# Patient Record
Sex: MALE | Race: WHITE | Employment: FULL TIME | URBAN - METROPOLITAN AREA
[De-identification: names, ages, dates, MRNs, and addresses within clinical notes are randomized per-mention and may not be internally consistent; named-entity substitution may affect disease eponyms.]

---

## 2017-05-23 ENCOUNTER — TELEPHONE (OUTPATIENT)
Dept: INTERNAL MEDICINE CLINIC | Facility: CLINIC | Age: 62
End: 2017-05-23

## 2017-05-23 NOTE — TELEPHONE ENCOUNTER
Patient reports experiencing anxiety and depression for the past couple months and is concerned that there could be a medical component that is contributing to these feelings.  He attempted to schedule an appt with Dr Ata Ontiveros, but no appts available until J

## 2017-05-23 NOTE — TELEPHONE ENCOUNTER
Actions Requested: scheduled appt w/ PCP 5/25/17  Situation/Background   Problem: repeated anxiety attacks, long periods of melancholy   Onset: > 3months ago becoming more frequent   Associated Symptoms: prolonged periods of melancholy and anxiety,  No new breathing and persists > 10 minutes and not relieved by reassurance provided by triager  * Lightheadedness or dizziness and persists > 10 minutes and not relieved by reassurance provided by triager  * Known or suspected alcohol or drug abuse and feeling ve

## 2017-05-25 ENCOUNTER — LAB ENCOUNTER (OUTPATIENT)
Dept: LAB | Facility: HOSPITAL | Age: 62
End: 2017-05-25
Attending: INTERNAL MEDICINE
Payer: COMMERCIAL

## 2017-05-25 DIAGNOSIS — F32.A DEPRESSIVE DISORDER: ICD-10-CM

## 2017-05-25 DIAGNOSIS — F41.9 ANXIETY: ICD-10-CM

## 2017-05-25 PROCEDURE — 85025 COMPLETE CBC W/AUTO DIFF WBC: CPT

## 2017-05-25 PROCEDURE — 80053 COMPREHEN METABOLIC PANEL: CPT

## 2017-05-25 PROCEDURE — 82607 VITAMIN B-12: CPT

## 2017-05-25 PROCEDURE — 84443 ASSAY THYROID STIM HORMONE: CPT

## 2017-05-25 PROCEDURE — 82306 VITAMIN D 25 HYDROXY: CPT

## 2017-05-25 PROCEDURE — 36415 COLL VENOUS BLD VENIPUNCTURE: CPT

## 2017-05-25 NOTE — PROGRESS NOTES
HPI:    Patient ID: Michelle Marin is a 65year old male. HPI  Patient is here with concerns about depression and anxiety. Is a relatively new issue for the patient. We saw him here last year and he was doing okay.   Just talked about the usual chron Age of Onset   • Cancer Father      Per NG: lung -smoker   • Cholelithiasis[other] [OTHER] Mother    • Cholelithiasis[other] [OTHER] Sister    • Cancer Paternal Grandfather      Per NG: lung -smoker        Smoking Status: Never Smoker content normal. His mood appears depressed.               ASSESSMENT/PLAN:   (F41.9) Anxiety  (primary encounter diagnosis)  Plan: VITAMIN B12, ASSAY, THYROID STIM HORMONE,         VITAMIN D, 25-HYDROXY, CBC WITH DIFFERENTIAL         WITH PLATELET, COMP MET

## 2017-09-23 ENCOUNTER — OFFICE VISIT (OUTPATIENT)
Dept: INTERNAL MEDICINE CLINIC | Facility: CLINIC | Age: 62
End: 2017-09-23

## 2017-09-23 VITALS
HEART RATE: 70 BPM | WEIGHT: 191 LBS | RESPIRATION RATE: 18 BRPM | BODY MASS INDEX: 27.97 KG/M2 | SYSTOLIC BLOOD PRESSURE: 102 MMHG | DIASTOLIC BLOOD PRESSURE: 68 MMHG | TEMPERATURE: 99 F | HEIGHT: 69.3 IN

## 2017-09-23 DIAGNOSIS — Z00.00 ROUTINE PHYSICAL EXAMINATION: Primary | ICD-10-CM

## 2017-09-23 DIAGNOSIS — Z23 NEED FOR VACCINATION: ICD-10-CM

## 2017-09-23 PROCEDURE — 90736 HZV VACCINE LIVE SUBQ: CPT | Performed by: INTERNAL MEDICINE

## 2017-09-23 PROCEDURE — 99396 PREV VISIT EST AGE 40-64: CPT | Performed by: INTERNAL MEDICINE

## 2017-09-23 PROCEDURE — 90471 IMMUNIZATION ADMIN: CPT | Performed by: INTERNAL MEDICINE

## 2017-09-23 NOTE — PROGRESS NOTES
HPI:    Patient ID: Mindi Armstrong is a 65year old male. HPI  Patient is here requesting a physical exam.  Last seen here in May. At that time he was having life stressors with depression and anxiety. He did not take any medications.   He did go to Smoker                                                              Smokeless tobacco: Never Used                      Alcohol use: No                   Review of Systems   Constitutional: Negative for chills, fatigue and fever.    HENT: Negative for hearin normal. He exhibits no mass. There is no tenderness. Hernia confirmed negative in the right inguinal area and confirmed negative in the left inguinal area.    Genitourinary: Prostate normal, testes normal and penis normal. Rectal exam shows external hemorrh

## 2017-10-07 ENCOUNTER — APPOINTMENT (OUTPATIENT)
Dept: LAB | Facility: HOSPITAL | Age: 62
End: 2017-10-07
Attending: INTERNAL MEDICINE
Payer: COMMERCIAL

## 2017-10-07 DIAGNOSIS — Z00.00 ROUTINE PHYSICAL EXAMINATION: ICD-10-CM

## 2017-10-07 PROCEDURE — 80061 LIPID PANEL: CPT

## 2017-10-07 PROCEDURE — 36415 COLL VENOUS BLD VENIPUNCTURE: CPT

## 2017-12-26 ENCOUNTER — MED REC SCAN ONLY (OUTPATIENT)
Dept: INTERNAL MEDICINE CLINIC | Facility: CLINIC | Age: 62
End: 2017-12-26

## 2017-12-26 ENCOUNTER — TELEPHONE (OUTPATIENT)
Dept: OTHER | Age: 62
End: 2017-12-26

## 2017-12-26 NOTE — TELEPHONE ENCOUNTER
Pt called to notify clinic site he will be faxing his DTAP immunization record to be entered into his chart. Pt had vaccine done at [place] [address]. Routed to clinic site.

## 2018-05-08 ENCOUNTER — OFFICE VISIT (OUTPATIENT)
Dept: INTERNAL MEDICINE CLINIC | Facility: CLINIC | Age: 63
End: 2018-05-08

## 2018-05-08 ENCOUNTER — APPOINTMENT (OUTPATIENT)
Dept: LAB | Facility: HOSPITAL | Age: 63
End: 2018-05-08
Attending: INTERNAL MEDICINE
Payer: COMMERCIAL

## 2018-05-08 VITALS
TEMPERATURE: 99 F | DIASTOLIC BLOOD PRESSURE: 66 MMHG | BODY MASS INDEX: 26.3 KG/M2 | HEART RATE: 72 BPM | HEIGHT: 69.5 IN | WEIGHT: 181.69 LBS | RESPIRATION RATE: 20 BRPM | SYSTOLIC BLOOD PRESSURE: 104 MMHG

## 2018-05-08 DIAGNOSIS — R97.20 ELEVATED PSA: Primary | ICD-10-CM

## 2018-05-08 DIAGNOSIS — E78.5 HYPERLIPIDEMIA, UNSPECIFIED HYPERLIPIDEMIA TYPE: ICD-10-CM

## 2018-05-08 DIAGNOSIS — R97.20 ELEVATED PSA: ICD-10-CM

## 2018-05-08 DIAGNOSIS — F41.9 ANXIETY: ICD-10-CM

## 2018-05-08 PROCEDURE — 36415 COLL VENOUS BLD VENIPUNCTURE: CPT

## 2018-05-08 PROCEDURE — 99212 OFFICE O/P EST SF 10 MIN: CPT | Performed by: INTERNAL MEDICINE

## 2018-05-08 PROCEDURE — 84153 ASSAY OF PSA TOTAL: CPT

## 2018-05-08 PROCEDURE — 99213 OFFICE O/P EST LOW 20 MIN: CPT | Performed by: INTERNAL MEDICINE

## 2018-05-08 NOTE — PROGRESS NOTES
HPI:    Patient ID: Byron Yuen is a 59year old male. HPI  Patient is here for follow-up on hyperlipidemia, elevated PSA, anxiety. Last seen here in September. Blood work done at that time included lipid profile as well as PSA of 5.6.   That was for hearing loss. Eyes: Negative for visual disturbance. Respiratory: Negative for cough and shortness of breath. Cardiovascular: Negative for chest pain and palpitations.    Gastrointestinal: Negative for nausea, vomiting, abdominal pain, diarrhea ASSESSMENT/PLAN:   (R97.20) Elevated PSA  (primary encounter diagnosis)  Plan: PSA         Mild elevation of PSA. Recheck now. Discussed the limitations of PSA is prostate cancer screening.   If further elevated, consider urology evaluation.    (E78.5)

## 2018-05-09 ENCOUNTER — TELEPHONE (OUTPATIENT)
Dept: OTHER | Age: 63
End: 2018-05-09

## 2018-05-09 NOTE — TELEPHONE ENCOUNTER
Pt called-stated he just received a call,advised of Lab result/dr message, verbalized understanding     Patient Result Comments     Written by Carleen Knutson MD on 5/9/2018  4:22 PM    the PSA level is now slightly higher at 7.3.  As we discussed yesterd

## 2018-05-10 ENCOUNTER — TELEPHONE (OUTPATIENT)
Dept: INTERNAL MEDICINE CLINIC | Facility: CLINIC | Age: 63
End: 2018-05-10

## 2018-05-10 NOTE — TELEPHONE ENCOUNTER
Patient states he is calling Dr. Kathrin Winters back - please call patient at earliest convenience. Thank you!

## 2018-05-14 ENCOUNTER — TELEPHONE (OUTPATIENT)
Dept: INTERNAL MEDICINE CLINIC | Facility: CLINIC | Age: 63
End: 2018-05-14

## 2018-05-22 ENCOUNTER — OFFICE VISIT (OUTPATIENT)
Dept: SURGERY | Facility: CLINIC | Age: 63
End: 2018-05-22

## 2018-05-22 VITALS
WEIGHT: 176 LBS | HEIGHT: 70 IN | BODY MASS INDEX: 25.2 KG/M2 | SYSTOLIC BLOOD PRESSURE: 109 MMHG | TEMPERATURE: 99 F | DIASTOLIC BLOOD PRESSURE: 72 MMHG | HEART RATE: 79 BPM

## 2018-05-22 DIAGNOSIS — R39.12 WEAK URINARY STREAM: ICD-10-CM

## 2018-05-22 DIAGNOSIS — Z79.82 ASPIRIN LONG-TERM USE: ICD-10-CM

## 2018-05-22 DIAGNOSIS — R35.0 BENIGN PROSTATIC HYPERPLASIA WITH URINARY FREQUENCY: ICD-10-CM

## 2018-05-22 DIAGNOSIS — N40.1 BENIGN PROSTATIC HYPERPLASIA WITH URINARY FREQUENCY: ICD-10-CM

## 2018-05-22 DIAGNOSIS — R97.20 ELEVATED PSA: Primary | ICD-10-CM

## 2018-05-22 PROCEDURE — 99212 OFFICE O/P EST SF 10 MIN: CPT | Performed by: UROLOGY

## 2018-05-22 PROCEDURE — 99244 OFF/OP CNSLTJ NEW/EST MOD 40: CPT | Performed by: UROLOGY

## 2018-05-22 NOTE — PATIENT INSTRUCTIONS
1. Proceed with plans for ultrasound-guided needle biopsy of prostate in the office    2. Stop aspirin  10   days before biopsy and stop NSAIDs such as Motrin, Advil, Aleve, naproxen, ibuprofen  7  days before procedure    3.   Stop fish oil pills 7 days

## 2018-05-22 NOTE — PROGRESS NOTES
Erick Rangel is a 59year old male. HPI:     History provided by pt, patient was referred by PCP Dr. Ryan Couch.     Elevated PSA  Elevated PSA -- denies associated symptoms to suggest prostate cancer such as weight loss or loss of appetite or bone pain maternal and paternal grandfather also diagnosed with cancer but not aware which type.      HISTORY:  Past Medical History:   Diagnosis Date   • Atypical chest pain 07/10/2008    Per NG : Normal stress echo   • History of cardiac catheterization 12/17/2001 vomiting    Neurological:  Negative for gait disturbance    Endocrine:  Negative for abnormal sleep patterns, increased activity, polydipsia and polyphagia    Allergic/Immuno:  Negative for environmental allergies and food allergies  Cardiovascular:  Negat enlarged, no palpable nodules or indurations   Skin/Hair: no unusual rashes present no abnormal bruising noted ; abdominal scars appendectomy scar   Back/Spine: no abnormalities noted  Extremities: no cyanosis, or clubbing; edema none    LABORATORIES    5/ further evaluation. RECOMMENDATIONS AND TREATMENT PLAN      1. Proceed with plans for ultrasound-guided needle biopsy of prostate in the office    2.    Stop aspirin  10   days before biopsy and stop NSAIDs such as Motrin, Advil, Aleve, naproxen, i Ashleigh Etienne MD, 5/22/2018, 5:56 PM

## 2018-06-04 ENCOUNTER — TELEPHONE (OUTPATIENT)
Dept: SURGERY | Facility: CLINIC | Age: 63
End: 2018-06-04

## 2018-06-05 RX ORDER — CIPROFLOXACIN 500 MG/1
500 TABLET, FILM COATED ORAL 2 TIMES DAILY
Qty: 6 TABLET | Refills: 0 | Status: SHIPPED | OUTPATIENT
Start: 2018-06-05 | End: 2018-12-20 | Stop reason: ALTCHOICE

## 2018-06-05 RX ORDER — CEFDINIR 300 MG/1
300 CAPSULE ORAL EVERY 12 HOURS
Qty: 6 CAPSULE | Refills: 0 | Status: SHIPPED | OUTPATIENT
Start: 2018-06-05 | End: 2018-06-08

## 2018-06-05 NOTE — TELEPHONE ENCOUNTER
Pt is scheduled for Trus bx's of prostate on 7/19/18. abx were not sent. Updated his pharmacy in [place] and reviewed with him 's standard pre prostate biopsies instructions. Pt verbalized understanding , agrees to plan and is thankful. . Matt Meyer, I have

## 2018-06-22 ENCOUNTER — OFFICE VISIT (OUTPATIENT)
Dept: ENDOCRINOLOGY CLINIC | Facility: CLINIC | Age: 63
End: 2018-06-22

## 2018-06-22 VITALS
BODY MASS INDEX: 26.11 KG/M2 | WEIGHT: 182.38 LBS | RESPIRATION RATE: 18 BRPM | HEART RATE: 76 BPM | HEIGHT: 70 IN | SYSTOLIC BLOOD PRESSURE: 114 MMHG | DIASTOLIC BLOOD PRESSURE: 71 MMHG | TEMPERATURE: 98 F

## 2018-06-22 DIAGNOSIS — Z78.9 TRANSGENDER: Primary | ICD-10-CM

## 2018-06-22 PROCEDURE — 99243 OFF/OP CNSLTJ NEW/EST LOW 30: CPT | Performed by: INTERNAL MEDICINE

## 2018-06-22 PROCEDURE — 99212 OFFICE O/P EST SF 10 MIN: CPT | Performed by: INTERNAL MEDICINE

## 2018-06-22 NOTE — PROGRESS NOTES
Name: Pablito Flores  Date: 6/22/2018    Referring Physician: No ref.  provider found    Patient presents with:  Consult: Hormone replacement therapy      HISTORY OF PRESENT ILLNESS   Pablito Flores is a 59year old male who presents for Patient present Rfl:   •  Ciprofloxacin HCl 500 MG Oral Tab, Take 1 tablet (500 mg total) by mouth 2 (two) times daily.  Start 1 day before  procedure, Disp: 6 tablet, Rfl: 0     Allergies:   No Known Allergies    Social History:   Social History    Marital status:  intact  Respiratory:  clear to auscultation bilaterally  Cardiovascular:  regular rate, rhythm, , no murmurs, S3 or S4  Gastrointestinal:  normal bowel sounds  Musculoskeletal:  normal muscle strength and tone  PV: normal pulses of carotids, pedals  Skin:

## 2018-07-09 ENCOUNTER — TELEPHONE (OUTPATIENT)
Dept: SURGERY | Facility: CLINIC | Age: 63
End: 2018-07-09

## 2018-07-09 ENCOUNTER — HOSPITAL ENCOUNTER (EMERGENCY)
Facility: HOSPITAL | Age: 63
Discharge: HOME | End: 2018-07-09
Attending: EMERGENCY MEDICINE
Payer: COMMERCIAL

## 2018-07-09 ENCOUNTER — OFFICE VISIT (OUTPATIENT)
Dept: SURGERY | Facility: CLINIC | Age: 63
End: 2018-07-09

## 2018-07-09 VITALS
BODY MASS INDEX: 26.05 KG/M2 | DIASTOLIC BLOOD PRESSURE: 78 MMHG | WEIGHT: 182 LBS | HEART RATE: 70 BPM | SYSTOLIC BLOOD PRESSURE: 110 MMHG | TEMPERATURE: 99 F | HEIGHT: 70 IN | RESPIRATION RATE: 16 BRPM

## 2018-07-09 VITALS
HEART RATE: 71 BPM | DIASTOLIC BLOOD PRESSURE: 82 MMHG | TEMPERATURE: 98 F | OXYGEN SATURATION: 99 % | RESPIRATION RATE: 20 BRPM | WEIGHT: 180 LBS | SYSTOLIC BLOOD PRESSURE: 115 MMHG | BODY MASS INDEX: 25.77 KG/M2 | HEIGHT: 70 IN

## 2018-07-09 DIAGNOSIS — R97.20 ELEVATED PSA: Primary | ICD-10-CM

## 2018-07-09 DIAGNOSIS — R55 SYNCOPE, NEAR: Primary | ICD-10-CM

## 2018-07-09 DIAGNOSIS — F32.A DEPRESSION, UNSPECIFIED DEPRESSION TYPE: ICD-10-CM

## 2018-07-09 LAB
ANION GAP SERPL CALC-SCNC: 6 MMOL/L (ref 0–18)
BASOPHILS # BLD: 0 K/UL (ref 0–0.2)
BASOPHILS NFR BLD: 1 %
BUN SERPL-MCNC: 14 MG/DL (ref 8–20)
BUN/CREAT SERPL: 12.5 (ref 10–20)
CALCIUM SERPL-MCNC: 9 MG/DL (ref 8.5–10.5)
CHLORIDE SERPL-SCNC: 102 MMOL/L (ref 95–110)
CO2 SERPL-SCNC: 27 MMOL/L (ref 22–32)
CREAT SERPL-MCNC: 1.12 MG/DL (ref 0.5–1.5)
EOSINOPHIL # BLD: 0 K/UL (ref 0–0.7)
EOSINOPHIL NFR BLD: 1 %
ERYTHROCYTE [DISTWIDTH] IN BLOOD BY AUTOMATED COUNT: 13.4 % (ref 11–15)
GLUCOSE SERPL-MCNC: 131 MG/DL (ref 70–99)
HCT VFR BLD AUTO: 43.2 % (ref 41–52)
HGB BLD-MCNC: 14.6 G/DL (ref 13.5–17.5)
LYMPHOCYTES # BLD: 0.9 K/UL (ref 1–4)
LYMPHOCYTES NFR BLD: 12 %
MCH RBC QN AUTO: 30.2 PG (ref 27–32)
MCHC RBC AUTO-ENTMCNC: 33.7 G/DL (ref 32–37)
MCV RBC AUTO: 89.7 FL (ref 80–100)
MONOCYTES # BLD: 0.3 K/UL (ref 0–1)
MONOCYTES NFR BLD: 4 %
NEUTROPHILS # BLD AUTO: 5.8 K/UL (ref 1.8–7.7)
NEUTROPHILS NFR BLD: 82 %
OSMOLALITY UR CALC.SUM OF ELEC: 282 MOSM/KG (ref 275–295)
PLATELET # BLD AUTO: 215 K/UL (ref 140–400)
PMV BLD AUTO: 7.5 FL (ref 7.4–10.3)
POTASSIUM SERPL-SCNC: 4 MMOL/L (ref 3.3–5.1)
RBC # BLD AUTO: 4.82 M/UL (ref 4.5–5.9)
SODIUM SERPL-SCNC: 135 MMOL/L (ref 136–144)
WBC # BLD AUTO: 7 K/UL (ref 4–11)

## 2018-07-09 PROCEDURE — 76872 US TRANSRECTAL: CPT | Performed by: UROLOGY

## 2018-07-09 PROCEDURE — 93010 ELECTROCARDIOGRAM REPORT: CPT | Performed by: EMERGENCY MEDICINE

## 2018-07-09 PROCEDURE — 99284 EMERGENCY DEPT VISIT MOD MDM: CPT

## 2018-07-09 PROCEDURE — 80048 BASIC METABOLIC PNL TOTAL CA: CPT | Performed by: EMERGENCY MEDICINE

## 2018-07-09 PROCEDURE — 93005 ELECTROCARDIOGRAM TRACING: CPT

## 2018-07-09 PROCEDURE — 76942 ECHO GUIDE FOR BIOPSY: CPT | Performed by: UROLOGY

## 2018-07-09 PROCEDURE — 36415 COLL VENOUS BLD VENIPUNCTURE: CPT

## 2018-07-09 PROCEDURE — 55700 BIOPSY OF PROSTATE,NEEDLE/PUNCH: CPT | Performed by: UROLOGY

## 2018-07-09 PROCEDURE — 85025 COMPLETE CBC W/AUTO DIFF WBC: CPT | Performed by: EMERGENCY MEDICINE

## 2018-07-09 NOTE — PROGRESS NOTES
I was called into the procedure room by the MA that assisted the MD with the pt's prostate bx procedure.  She informed me that at the completion of the procedure she was repositioning the pt onto his back (pt was in the Lt side lying position for the proced

## 2018-07-09 NOTE — ED INITIAL ASSESSMENT (HPI)
Pt presents to ED via ems after experiencing a near syncopal episode while having a prostate biopsy. Pt arrives to ED aox3, vitals kashife.

## 2018-07-09 NOTE — PROGRESS NOTES
Late Entry. Was called to exam room, by WADE. Pt noted to be sitting in chair, stating \" I don't feel well. \" Skin was tan/pink, warm and moist, speech clear.  Oriented x 3,Denied difficulty breathing , chest pain, or blurred vision, however appeared to be

## 2018-07-09 NOTE — ED NOTES
Pt verbalized suicidal thoughts during triage, scored low risk during screening. Dr. Hansa Stewart updated. [facility]  called for pt resources.  Pt states he sees an outpatient therapist.

## 2018-07-09 NOTE — TELEPHONE ENCOUNTER
Pardeep Soto underwent successful prostate needle biopsy in the office because of elevated PSA.     Vasovagal and apparent anxiety reaction  Approximately 2 min after successful office prostate biopsy today, pt felt faint; we returned to the room and pt appe

## 2018-07-09 NOTE — BH LEVEL OF CARE ASSESSMENT
Level of Care Assessment Note    General Questions  Why are you here?: \"I had a biposy on my prostrate and at the end of the procedure I fainted. I recovered and went into the waiting room and 5 min later I had another spell.   THe tingling, nauseua, was thoughts and had some intention of acting on them? (past 30 days): No (Pt had a plan about a montha ago but did not act on those thoughts.)  6. Have you ever done anything, started to do anything, or prepared to do anything to end your life? (lifetime):  Migdalia Nichols it?: No    Access to Means  Has access to means to attempt suicide or harm others or property: Yes  Description of Access: Household means;  See below  Discussion of Removal of Access to Means: Yes  Access to Firearm/Weapon: Yes  Current Location of Firearm containing alcohol? : 1 time per month or less  Alcohol Use  Age at first use?: Unknown  Route: Oral  Average amount used? : Couple beers  How long with this pattern of use?: Years as a social drinker  Last Use?: Unknown  Is your current use the most/worst Confused;Stressed;Depressed  Appropriateness of Affect: Congruent to mood  Range of Affect: Flat  Stability of Affect: Stable  Attitude toward staff: Co-operative;Open  Speech  Rate of Speech: Appropriate  Flow of Speech: Appropriate  Intensity of Volume: consult for medication. Neither pt nor son wanted pt to be psychiatrically hospitalized and agreed to follow up [address] [facility] outpatient psychiatry and sign a safety contract. Risk/Protective Factors  Risk Factors: History of suicidal behavior; Access to lethal

## 2018-07-09 NOTE — ED PROVIDER NOTES
Patient Seen in: [facility] Emergency Department    History   Patient presents with:  Lightheadedness    Stated Complaint: near syncope    HPI    Patient is a 61-year-old male who arrives by EMS for near syncopal event that occurred status post pr °F (36.8 °C) (Oral)   Resp 18   Ht 177.8 cm (5' 10\")   Wt 81.6 kg   SpO2 98%   BMI 25.83 kg/m²         Physical Exam    GENERAL: No acute distress, awake and alert  HEENT: mucus membranes slightly dry, EOMI, PERRL  Neck: supple, non tender  CV: RRR, no mu suicidal thoughts. Patient had full evaluation by psych tech. Patient states that he sometimes gets depressed because he has gender dysphoria and is a cross dresser.   Patient states that he sees a therapist weekly but is not on any current antidepressant

## 2018-07-09 NOTE — PROGRESS NOTES
[address] Patient Status:  Outpatient    3/21/1955 MRN [id number]   Location [address] Attending [facility] Day # 0 PCP MD Gauri Verma is prostate and seminal vesicle at the left base; right apex of the prostate and on the left apex of the prostate; I also injected 1% Xylocaine into the mid aspect of the prostate tissue itself bilaterally.  After the infiltrations were performed using VidBid Bi and had numbness and loss of sensation in both arms; he was placed in trendelenburg position. He denied any CP. He was observe by our nurses and felt normal and given orange juice.  He subsequently went to office  to schedule next appointment and

## 2018-07-09 NOTE — PROGRESS NOTES
This is a late entry note:  Right after the procedure around 10:10am I noticed when I had pt lay onto his back that patients lips and face became pale, pt stated he had a lot of abdomen cramping I then placed pt in trendelenburg position right away and kaylie

## 2018-07-17 ENCOUNTER — TELEPHONE (OUTPATIENT)
Dept: SURGERY | Facility: CLINIC | Age: 63
End: 2018-07-17

## 2018-07-17 NOTE — TELEPHONE ENCOUNTER
Pt called stating pt was able to review the prostate biopsy results on my chart. Pt has questions on results.   Call pt

## 2018-07-18 NOTE — TELEPHONE ENCOUNTER
I spoke with pt and reviewed all of the results msg and instructions in PVK's note below and pt states that he wanted to make sure that it was expected to see a small amt of blood in his urine 1 week after the prostate bx procedure and I told him that it

## 2018-07-19 NOTE — TELEPHONE ENCOUNTER
I called and spoke with patient. Patient states blood in urine is becoming \"more and more infrequent\". Patient states his urination at night could be attributed to the fact that we was drinking more liquid before bedtime.   Patient denies any dysuria, f

## 2018-12-20 ENCOUNTER — OFFICE VISIT (OUTPATIENT)
Dept: INTERNAL MEDICINE CLINIC | Facility: CLINIC | Age: 63
End: 2018-12-20
Payer: COMMERCIAL

## 2018-12-20 VITALS
RESPIRATION RATE: 20 BRPM | WEIGHT: 186.5 LBS | HEART RATE: 72 BPM | TEMPERATURE: 98 F | BODY MASS INDEX: 26.7 KG/M2 | HEIGHT: 70 IN | DIASTOLIC BLOOD PRESSURE: 66 MMHG | SYSTOLIC BLOOD PRESSURE: 104 MMHG

## 2018-12-20 DIAGNOSIS — F32.A DEPRESSIVE DISORDER: ICD-10-CM

## 2018-12-20 DIAGNOSIS — Z00.00 ROUTINE PHYSICAL EXAMINATION: Primary | ICD-10-CM

## 2018-12-20 DIAGNOSIS — R97.20 ELEVATED PSA: ICD-10-CM

## 2018-12-20 DIAGNOSIS — E78.5 HYPERLIPIDEMIA, UNSPECIFIED HYPERLIPIDEMIA TYPE: ICD-10-CM

## 2018-12-20 PROCEDURE — 99396 PREV VISIT EST AGE 40-64: CPT | Performed by: INTERNAL MEDICINE

## 2018-12-20 NOTE — PROGRESS NOTES
HPI:    Patient ID: Sin Turcios is a 59year old male. HPI  Patient is here requesting general physical exam.  Last seen here in May. Since that time he saw the urology doctor. Prostate biopsy done for elevated PSA.   No evidence of prostate cance History    Tobacco Use      Smoking status: Never Smoker      Smokeless tobacco: Never Used    Alcohol use: No    Drug use: No           Review of Systems   Constitutional: Negative for chills, fatigue and fever. HENT: Negative for hearing loss.     Eyes: wheezes. He has no rales. Abdominal: Soft. Bowel sounds are normal. He exhibits no mass. There is no tenderness. Hernia confirmed negative in the right inguinal area and confirmed negative in the left inguinal area.    Genitourinary: Testes normal and pen

## 2018-12-22 ENCOUNTER — LAB ENCOUNTER (OUTPATIENT)
Dept: LAB | Facility: HOSPITAL | Age: 63
End: 2018-12-22
Attending: INTERNAL MEDICINE
Payer: COMMERCIAL

## 2018-12-22 DIAGNOSIS — R97.20 ELEVATED PSA: ICD-10-CM

## 2018-12-22 DIAGNOSIS — Z00.00 ROUTINE PHYSICAL EXAMINATION: ICD-10-CM

## 2018-12-22 PROCEDURE — 84443 ASSAY THYROID STIM HORMONE: CPT

## 2018-12-22 PROCEDURE — 84154 ASSAY OF PSA FREE: CPT

## 2018-12-22 PROCEDURE — 84153 ASSAY OF PSA TOTAL: CPT

## 2018-12-22 PROCEDURE — 36415 COLL VENOUS BLD VENIPUNCTURE: CPT

## 2018-12-22 PROCEDURE — 80053 COMPREHEN METABOLIC PANEL: CPT

## 2018-12-22 PROCEDURE — 80061 LIPID PANEL: CPT

## 2018-12-22 PROCEDURE — 85025 COMPLETE CBC W/AUTO DIFF WBC: CPT

## 2019-01-07 ENCOUNTER — TELEPHONE (OUTPATIENT)
Dept: SURGERY | Facility: CLINIC | Age: 64
End: 2019-01-07

## 2019-01-07 DIAGNOSIS — R97.20 ELEVATED PSA: Primary | ICD-10-CM

## 2019-01-07 NOTE — TELEPHONE ENCOUNTER
Pt called stating pt's pcp had order a psa blood test.  Pt requesting pvk to review the results. Should pt schedule appt?   Call pt to advise

## 2019-01-09 NOTE — TELEPHONE ENCOUNTER
Phoned pt and spoke with him. Read to him 's result note as outlined below in this encounter, in it's entirety. Pt verbalized understanding and agrees to plan.  Offered to arrange appt for pt, however he states he will call back, as he does not have a

## 2019-01-09 NOTE — TELEPHONE ENCOUNTER
Nurses, please call patient back. 12/22/18 Dr. Klaus Cuevas ordered PSA 8.0 elevated and higher than 5/8/18 when PSA was 7.3.   Please asked patient to make an appointment to see me in about 2--2.5 months and to get blood draw for PSA--total and free 1--7 d

## 2019-03-09 ENCOUNTER — LAB ENCOUNTER (OUTPATIENT)
Dept: LAB | Facility: HOSPITAL | Age: 64
End: 2019-03-09
Attending: UROLOGY
Payer: COMMERCIAL

## 2019-03-09 DIAGNOSIS — R97.20 ELEVATED PSA: ICD-10-CM

## 2019-03-09 LAB
PSA FREE MFR SERPL: 17 %
PSA FREE SERPL-MCNC: 1.32 NG/ML
PSA SERPL-MCNC: 7.8 NG/ML (ref ?–4)

## 2019-03-09 PROCEDURE — 36415 COLL VENOUS BLD VENIPUNCTURE: CPT

## 2019-03-09 PROCEDURE — 84153 ASSAY OF PSA TOTAL: CPT

## 2019-03-09 PROCEDURE — 84154 ASSAY OF PSA FREE: CPT

## 2019-03-15 ENCOUNTER — OFFICE VISIT (OUTPATIENT)
Dept: SURGERY | Facility: CLINIC | Age: 64
End: 2019-03-15
Payer: COMMERCIAL

## 2019-03-15 VITALS
WEIGHT: 186 LBS | TEMPERATURE: 98 F | BODY MASS INDEX: 26.63 KG/M2 | SYSTOLIC BLOOD PRESSURE: 122 MMHG | RESPIRATION RATE: 16 BRPM | DIASTOLIC BLOOD PRESSURE: 74 MMHG | HEIGHT: 70 IN | HEART RATE: 76 BPM

## 2019-03-15 DIAGNOSIS — N40.1 BENIGN PROSTATIC HYPERPLASIA WITH URINARY FREQUENCY: ICD-10-CM

## 2019-03-15 DIAGNOSIS — R39.12 WEAK URINARY STREAM: ICD-10-CM

## 2019-03-15 DIAGNOSIS — R35.1 NOCTURIA: ICD-10-CM

## 2019-03-15 DIAGNOSIS — Z79.82 ASPIRIN LONG-TERM USE: ICD-10-CM

## 2019-03-15 DIAGNOSIS — R35.0 BENIGN PROSTATIC HYPERPLASIA WITH URINARY FREQUENCY: ICD-10-CM

## 2019-03-15 DIAGNOSIS — R55 VASOVAGAL REACTION: ICD-10-CM

## 2019-03-15 DIAGNOSIS — R97.20 ELEVATED PSA: Primary | ICD-10-CM

## 2019-03-15 PROCEDURE — 99212 OFFICE O/P EST SF 10 MIN: CPT | Performed by: UROLOGY

## 2019-03-15 PROCEDURE — 99214 OFFICE O/P EST MOD 30 MIN: CPT | Performed by: UROLOGY

## 2019-03-15 NOTE — PATIENT INSTRUCTIONS
Reginaldo Marrero M.D.    1.  Please let us know if you decide that you would like to start finasteride (brand name Proscar)              5 mg daily for your BPH--enlarged prostate. 2.  Visit in 6 months.   Please get blood

## 2019-03-15 NOTE — PROGRESS NOTES
HPI:    Patient ID: Odette Carnes is a 59year old male. HPI  Elevated PSA  Problem started 11/12/16 PSA = 4.4. Negative biopsy 7/9/2018. 3/9/19 PSA = 7.80; 17% free PSA (30.3% probability of prostate cancer).  He denies associated symptoms to suggest from lung cancer; patient states his maternal and paternal grandfather also diagnosed with cancer but not aware which type.       Review of previous records:  5/8/18: Dr. Regan Grade: \"Psychologist and group therapy for anxiety and depression; exercises 6 days Neurological: Negative for speech difficulty. Psychiatric/Behavioral: The patient is not nervous/anxious. Current Outpatient Medications:  Saw Palmetto 80 MG Oral Cap Take 1 capsule by mouth daily.  Disp:  Rfl:    Cholecalciferol (VITAMIN D-3 respiratory distress. Genitourinary:   Genitourinary Comments: On DANIELLE, prostate 2-3+ enlarged, no palpable nodules or indurations. Musculoskeletal: Normal range of motion. Neurological: He is alert and oriented to person, place, and time.    Skin: Ski finasteride vs observation with patient including risks, benefits, and alternatives. I do explained to patient that starting finasteride would complicate decision making for elevated PSA for a year because the medication alters PSA levels.  He indicates his test 1--10 days before visit. No sexual stimulation for 5 days before the actual PSA blood draw.         Orders Placed This Encounter      PSA, Total W Reflex To Free      Urinalysis, Routine- Future      Meds This Visit:  Requested Prescriptions      No p

## 2019-06-24 ENCOUNTER — LAB ENCOUNTER (OUTPATIENT)
Dept: LAB | Facility: HOSPITAL | Age: 64
End: 2019-06-24
Attending: FAMILY MEDICINE
Payer: COMMERCIAL

## 2019-06-24 DIAGNOSIS — Z00.00 ENCOUNTER FOR ROUTINE ADULT MEDICAL EXAMINATION: Primary | ICD-10-CM

## 2019-06-24 PROCEDURE — 85025 COMPLETE CBC W/AUTO DIFF WBC: CPT

## 2019-06-24 PROCEDURE — 80061 LIPID PANEL: CPT

## 2019-06-24 PROCEDURE — 82670 ASSAY OF TOTAL ESTRADIOL: CPT

## 2019-06-24 PROCEDURE — 80053 COMPREHEN METABOLIC PANEL: CPT

## 2019-06-24 PROCEDURE — 84403 ASSAY OF TOTAL TESTOSTERONE: CPT

## 2019-06-24 PROCEDURE — 36415 COLL VENOUS BLD VENIPUNCTURE: CPT

## 2019-06-24 PROCEDURE — 84443 ASSAY THYROID STIM HORMONE: CPT

## 2019-09-20 ENCOUNTER — OFFICE VISIT (OUTPATIENT)
Dept: SURGERY | Facility: CLINIC | Age: 64
End: 2019-09-20
Payer: COMMERCIAL

## 2019-09-20 VITALS
HEIGHT: 69 IN | BODY MASS INDEX: 26.81 KG/M2 | DIASTOLIC BLOOD PRESSURE: 76 MMHG | WEIGHT: 181 LBS | HEART RATE: 74 BPM | SYSTOLIC BLOOD PRESSURE: 118 MMHG

## 2019-09-20 DIAGNOSIS — Z79.82 ASPIRIN LONG-TERM USE: ICD-10-CM

## 2019-09-20 DIAGNOSIS — R35.1 NOCTURIA: ICD-10-CM

## 2019-09-20 DIAGNOSIS — R35.0 BENIGN PROSTATIC HYPERPLASIA WITH URINARY FREQUENCY: ICD-10-CM

## 2019-09-20 DIAGNOSIS — R35.0 URINARY FREQUENCY: ICD-10-CM

## 2019-09-20 DIAGNOSIS — R55 VASOVAGAL REACTION: ICD-10-CM

## 2019-09-20 DIAGNOSIS — R97.20 ELEVATED PSA: Primary | ICD-10-CM

## 2019-09-20 DIAGNOSIS — Z78.9 MALE-TO-FEMALE TRANSGENDER PERSON: ICD-10-CM

## 2019-09-20 DIAGNOSIS — N40.1 BENIGN PROSTATIC HYPERPLASIA WITH URINARY FREQUENCY: ICD-10-CM

## 2019-09-20 LAB
BACTERIA UR QL AUTO: NEGATIVE /HPF
BILIRUB UR QL: NEGATIVE
CLARITY UR: CLEAR
COLOR UR: YELLOW
GLUCOSE UR-MCNC: NEGATIVE MG/DL
HGB UR QL STRIP.AUTO: NEGATIVE
KETONES UR-MCNC: NEGATIVE MG/DL
NITRITE UR QL STRIP.AUTO: NEGATIVE
PH UR: 5 [PH] (ref 5–8)
PROT UR-MCNC: NEGATIVE MG/DL
RBC #/AREA URNS AUTO: <1 /HPF
SP GR UR STRIP: 1.02 (ref 1–1.03)
UROBILINOGEN UR STRIP-ACNC: <2
WBC #/AREA URNS AUTO: 5 /HPF

## 2019-09-20 PROCEDURE — 99215 OFFICE O/P EST HI 40 MIN: CPT | Performed by: UROLOGY

## 2019-09-20 RX ORDER — AMPICILLIN TRIHYDRATE 250 MG
CAPSULE ORAL
COMMUNITY
End: 2020-12-21 | Stop reason: ALTCHOICE

## 2019-09-20 RX ORDER — SPIRONOLACTONE 100 MG/1
100 TABLET, FILM COATED ORAL
Refills: 3 | COMMUNITY
Start: 2019-07-29

## 2019-09-20 NOTE — PROGRESS NOTES
HPI:    Patient ID: Pablito Flores is a 62year old male. HPI    Transgender Male to Female  The patient requests to be called Meron Sewell following this visit. The patient plans to start hormone therapy by the end of 2019.  He sees Dr. Franchesca Xiong relief. He denies any pelvic pain or discomfort. He feels this is stable.     Vasovagal/ anxiety reaction  After office prostate biopsy on 7/9/2018 patient felt faint, appeared pale, and clammy; stated he was anxious and scared and had numbness and loss of patient; paramedics present. Patient stated he was feeling better and I recommended and pt agreed to be taken to the emergency room by paramedics  3/15/19 office visit with me;  On DANIELLE, prostate 2-3+ enlarged, no palpable nodules or indurations; discussed s with polypectomy; recheck 7/11   • SVT (supraventricular tachycardia) ([facility], [place] [address] 12,2001    Per NG; Shot in ER ( likely adenosine)      Past Surgical History:   Procedure Laterality Date   • APPENDECTOMY  1975    Apppendictis   • COLONOSCOPY     • ELECTROCARDIO time. He appears well-developed and well-nourished. No distress. HENT:   Head: Normocephalic and atraumatic. Eyes: EOM are normal.   Neck: Normal range of motion. Pulmonary/Chest: Effort normal. No respiratory distress.    Genitourinary:   Hailee Glenny than 30.3%. On DANIELLE, prostate 3+ enlarged, no palpable nodules or indurations.  In light of this, I inform the patient that he has a 2/3 probability that his elevated PSA is due to his BPH and a 1/3 probability that his elevated PSA is due to prostate cancer and loss of sensation in both arms. Ambulance was called and I went in to examine the patient; paramedics present. Patient stated he was feeling better and I recommended and pt agreed to be taken to the emergency room by paramedics.  Noteworthy that patient urine test 1--10 days    B [facility]   next visit with me. Please make appointment to see me in 6 months.         Orders Placed This Encounter      Urinalysis, Specimen      PSA, Total W Reflex To Free      PSA, Total W Reflex To Free      Urinalysis, Rout

## 2019-09-20 NOTE — PATIENT INSTRUCTIONS
Kymberly Shen M.D.      1.     Try to take evening and bedtime medications with a sip of water instead of a gulp of water to try to improve the number of times you wake up at night to urinate.     2.  Urinalysis u

## 2019-09-28 ENCOUNTER — LAB ENCOUNTER (OUTPATIENT)
Dept: LAB | Facility: HOSPITAL | Age: 64
End: 2019-09-28
Attending: UROLOGY
Payer: COMMERCIAL

## 2019-09-28 DIAGNOSIS — R97.20 ELEVATED PSA: ICD-10-CM

## 2019-09-28 DIAGNOSIS — R35.1 NOCTURIA: ICD-10-CM

## 2019-09-28 LAB
PSA FREE MFR SERPL: 13 %
PSA FREE SERPL-MCNC: 1.02 NG/ML
PSA SERPL-MCNC: 8.38 NG/ML (ref ?–4)

## 2019-09-28 PROCEDURE — 84154 ASSAY OF PSA FREE: CPT

## 2019-09-28 PROCEDURE — 36415 COLL VENOUS BLD VENIPUNCTURE: CPT

## 2019-09-28 PROCEDURE — 84153 ASSAY OF PSA TOTAL: CPT

## 2019-10-02 ENCOUNTER — TELEPHONE (OUTPATIENT)
Dept: GASTROENTEROLOGY | Facility: CLINIC | Age: 64
End: 2019-10-02

## 2019-10-02 NOTE — TELEPHONE ENCOUNTER
----- Message from Rosemary Heller RN sent at 11/3/2015  4:46 PM CST -----  Regarding: Recall Colon  Recall colon in 4 years per GS.  Colon done 11/2/15

## 2019-12-23 ENCOUNTER — OFFICE VISIT (OUTPATIENT)
Dept: INTERNAL MEDICINE CLINIC | Facility: CLINIC | Age: 64
End: 2019-12-23
Payer: COMMERCIAL

## 2019-12-23 VITALS
DIASTOLIC BLOOD PRESSURE: 72 MMHG | HEIGHT: 70 IN | HEART RATE: 66 BPM | SYSTOLIC BLOOD PRESSURE: 108 MMHG | BODY MASS INDEX: 26.63 KG/M2 | TEMPERATURE: 99 F | RESPIRATION RATE: 20 BRPM | WEIGHT: 186 LBS

## 2019-12-23 DIAGNOSIS — E78.5 HYPERLIPIDEMIA, UNSPECIFIED HYPERLIPIDEMIA TYPE: ICD-10-CM

## 2019-12-23 DIAGNOSIS — F41.9 ANXIETY: ICD-10-CM

## 2019-12-23 DIAGNOSIS — Z00.00 ROUTINE PHYSICAL EXAMINATION: Primary | ICD-10-CM

## 2019-12-23 PROCEDURE — 99396 PREV VISIT EST AGE 40-64: CPT | Performed by: INTERNAL MEDICINE

## 2019-12-23 NOTE — PROGRESS NOTES
HPI:    Patient ID: Suraj Orourke is a 62year old male. HPI  Patient is here requesting general physical exam.  Last seen here on December 20. That was following a prostate biopsy. There was some complications with that.   Patient saw urologist rec adenosine)      Past Surgical History:   Procedure Laterality Date   • APPENDECTOMY  1975    Apppendictis   • COLONOSCOPY     • ELECTROCARDIOGRAM, COMPLETE  11/6/2011    scanned to media tab   • NM HEPATOBILIARY SCAN  (SAG=[id number])  11/16/2011    Per NG: lap Position: Sitting, Cuff Size: large)   Pulse 66   Temp 98.8 °F (37.1 °C) (Oral)   Resp 20   Ht 5' 10\" (1.778 m)   Wt 186 lb (84.4 kg)   BMI 26.69 kg/m²      Physical Exam    Constitutional: He appears well-developed and well-nourished.    HENT:   Right Ear blood work done earlier in the year. No labs at this time. 2. Hyperlipidemia, unspecified hyperlipidemia type  Well-controlled. On no medication. 3. Anxiety  Better now on sertraline. He is having some nausea intermittently.   No clear reason by hi

## 2020-02-17 ENCOUNTER — NURSE ONLY (OUTPATIENT)
Dept: GASTROENTEROLOGY | Facility: CLINIC | Age: 65
End: 2020-02-17

## 2020-02-17 ENCOUNTER — TELEPHONE (OUTPATIENT)
Dept: GASTROENTEROLOGY | Facility: CLINIC | Age: 65
End: 2020-02-17

## 2020-02-17 DIAGNOSIS — Z86.010 PERSONAL HISTORY OF COLONIC POLYPS: Primary | ICD-10-CM

## 2020-02-17 RX ORDER — POLYETHYLENE GLYCOL 3350, SODIUM CHLORIDE, SODIUM BICARBONATE, POTASSIUM CHLORIDE 420; 11.2; 5.72; 1.48 G/4L; G/4L; G/4L; G/4L
POWDER, FOR SOLUTION ORAL
Qty: 1 BOTTLE | Refills: 0 | Status: ON HOLD | OUTPATIENT
Start: 2020-02-17 | End: 2020-05-18

## 2020-02-17 NOTE — TELEPHONE ENCOUNTER
Pharmacy states they do not carry PEG and requesting generic prep kit. Pharmacy states they only carry 420 g PEG.          Current Outpatient Medications:   •  PEG 3350-KCl-NaBcb-NaCl-NaSulf (COLYTE WITH FLAVOR PACKS) 240 g Oral Recon Soln, Take prep as dire

## 2020-02-17 NOTE — PROGRESS NOTES
The patient's chart has been reviewed. Okay to schedule pt for 5 year CLN recall (due November 2020) r/t history of colon polyps with Dr. Leanne Chery.      Advise no sedation per patient preference/previous procedure (patient should be aware that an IV gilma

## 2020-02-17 NOTE — TELEPHONE ENCOUNTER
[address] does not have colyte and requesting an alternative- want a new RX entirely will not sub for trilyte. Please review and sign pended order, thank you.

## 2020-02-17 NOTE — PROGRESS NOTES
Forwarded to [facility]. This is a recall. Meds/allergies reviewed.      Last Procedure: colon hx polyp high grade dysplasia 11/2/15 Dr Maricruz Fried  Last Diagnosis:  Found polyp  Recalled for (years): 5 yrs  Sedation used previously:  NO SEDATION PREVIOUS-   *  No evidence of dysplasia.       TISSUES SUBMITTED                   A.  Polyp, transverse colon - polypectomy site       CLINICAL HISTORY:                   History of polyps.       GROSS DESCRIPTION                   The specimen is received in form examination revealed no palpable intraluminal   abnormalities. Sphincter tone was somewhat heightened.   An Olympus   variable-stiffness #180  series HD colonoscope was inserted into the rectum and advanced   under  direct vision by following the lumen to

## 2020-02-17 NOTE — PROGRESS NOTES
Scheduled for:  Colonoscopy - [address]1960  Provider Name:  Dr. Roya Gary  Date:  5/11/20  Location:  [facility]  Sedation:  NONE  Time:  11:15 am (pt is aware to arrive at 10:15 am)  Prep:  Colyte, Prep instructions were given to pt over the phone, pt verbalized und

## 2020-03-25 ENCOUNTER — TELEPHONE (OUTPATIENT)
Dept: SURGERY | Facility: CLINIC | Age: 65
End: 2020-03-25

## 2020-03-25 NOTE — TELEPHONE ENCOUNTER
Spoke to patient. Due to COVID-19 (Coronavirus) global pandemic, patient's office visit on Friday is converted to virtual telephone visit. Patient states he would like to have virtual telephone visit. Patient cell # is preferred [phone].  Patient is a

## 2020-03-25 NOTE — TELEPHONE ENCOUNTER
Now as of this minute, noticed that patient cancelled appointment. I tried to call patient, but no answer. Dr. Russ Badillo, disregard message below.

## 2020-04-13 ENCOUNTER — TELEPHONE (OUTPATIENT)
Dept: SURGERY | Facility: CLINIC | Age: 65
End: 2020-04-13

## 2020-05-13 ENCOUNTER — TELEPHONE (OUTPATIENT)
Dept: GASTROENTEROLOGY | Facility: CLINIC | Age: 65
End: 2020-05-13

## 2020-05-13 DIAGNOSIS — Z86.010 HISTORY OF COLON POLYPS: Primary | ICD-10-CM

## 2020-05-13 NOTE — TELEPHONE ENCOUNTER
Scheduled for:  Colonoscopy 00691  Provider Name:  Dr Tasha (R)   Date:  5/18/20  Location:    [facility]  Sedation:  None  Time:  7400 (pt is aware to arrive at 0915)   Prep:  Colyte, sent via Winks/Allergies Reconciled?:  Physician reviewed   [address]

## 2020-05-14 ENCOUNTER — TELEPHONE (OUTPATIENT)
Dept: GASTROENTEROLOGY | Facility: CLINIC | Age: 65
End: 2020-05-14

## 2020-05-18 ENCOUNTER — HOSPITAL ENCOUNTER (OUTPATIENT)
Facility: HOSPITAL | Age: 65
Setting detail: HOSPITAL OUTPATIENT SURGERY
Discharge: HOME | End: 2020-05-18
Attending: INTERNAL MEDICINE | Admitting: INTERNAL MEDICINE
Payer: COMMERCIAL

## 2020-05-18 VITALS
HEART RATE: 61 BPM | OXYGEN SATURATION: 98 % | TEMPERATURE: 99 F | HEIGHT: 70 IN | BODY MASS INDEX: 26.48 KG/M2 | WEIGHT: 185 LBS | DIASTOLIC BLOOD PRESSURE: 86 MMHG | RESPIRATION RATE: 12 BRPM | SYSTOLIC BLOOD PRESSURE: 107 MMHG

## 2020-05-18 DIAGNOSIS — Z01.818 PRE-OP TESTING: ICD-10-CM

## 2020-05-18 DIAGNOSIS — Z86.010 HISTORY OF COLON POLYPS: Primary | ICD-10-CM

## 2020-05-18 LAB — COLONOSCOPY STUDY: ABNORMAL

## 2020-05-18 PROCEDURE — 0DBH8ZX EXCISION OF CECUM, VIA NATURAL OR ARTIFICIAL OPENING ENDOSCOPIC, DIAGNOSTIC: ICD-10-PCS | Performed by: INTERNAL MEDICINE

## 2020-05-18 PROCEDURE — 45385 COLONOSCOPY W/LESION REMOVAL: CPT | Performed by: INTERNAL MEDICINE

## 2020-05-18 RX ORDER — SODIUM CHLORIDE, SODIUM LACTATE, POTASSIUM CHLORIDE, CALCIUM CHLORIDE 600; 310; 30; 20 MG/100ML; MG/100ML; MG/100ML; MG/100ML
INJECTION, SOLUTION INTRAVENOUS CONTINUOUS
Status: DISCONTINUED | OUTPATIENT
Start: 2020-05-18 | End: 2020-05-18

## 2020-05-18 RX ORDER — SODIUM CHLORIDE 0.9 % (FLUSH) 0.9 %
10 SYRINGE (ML) INJECTION AS NEEDED
Status: DISCONTINUED | OUTPATIENT
Start: 2020-05-18 | End: 2020-05-18

## 2020-05-18 RX ORDER — MIDAZOLAM HYDROCHLORIDE 1 MG/ML
1 INJECTION INTRAMUSCULAR; INTRAVENOUS EVERY 5 MIN PRN
Status: DISCONTINUED | OUTPATIENT
Start: 2020-05-18 | End: 2020-05-18

## 2020-05-18 NOTE — H&P
History & Physical Examination    Patient Name: Kaden Metzger  MRN: [id number]  [facility]: [id number]  YOB: 1955    Diagnosis: Personal history of adenomatous colon polyps, colorectal cancer screening      spironolactone 100 MG Oral Tab, Take 10 Family History   Problem Relation Age of Onset   • Cancer Father         Per NG: lung -smoker   • Other (Cholelithiasis) Mother    • Other (Cholelithiasis) Sister    • Cancer Paternal Grandfather         Per NG: lung -smoker     Social History    [place]

## 2020-05-18 NOTE — OPERATIVE REPORT
[facility] Endoscopy Report      Date of Procedure:  05/18/20      Preoperative Diagnosis:  1. Personal history of adenomatous colon polyps  2.   Colorectal cancer screening      Postoperative Diagnosis:  Diminutive cecal polyp      Procedu abnormalities. The procedure was well tolerated without immediate complication. Impression:  1. Diminutive cecal polyp  2. Otherwise normal colonoscopy to the terminal ileum    Recommendations:  1. Follow-up biopsy results.   2.  Probable surveilla

## 2020-05-20 ENCOUNTER — TELEPHONE (OUTPATIENT)
Dept: GASTROENTEROLOGY | Facility: CLINIC | Age: 65
End: 2020-05-20

## 2020-06-01 ENCOUNTER — TELEPHONE (OUTPATIENT)
Dept: SURGERY | Facility: CLINIC | Age: 65
End: 2020-06-01

## 2020-06-01 NOTE — TELEPHONE ENCOUNTER
S/W pt and determined that he is having severe painful urination with grossly bloody urine. I instructed pt to seek TX at an ER or an urgent care facility.  Pt stated that the hospital was over an hour away in [address] and I then suggested an urgent care fac

## 2020-06-01 NOTE — TELEPHONE ENCOUNTER
Patient currently in [place], patient is experiencing painful, bloody urine that started this morning. Please call at:[phone],thanks.   *Patricia/pharmacy- [address] [place] [address]

## 2020-06-09 ENCOUNTER — VIRTUAL PHONE E/M (OUTPATIENT)
Dept: SURGERY | Facility: CLINIC | Age: 65
End: 2020-06-09
Payer: COMMERCIAL

## 2020-06-09 ENCOUNTER — TELEPHONE (OUTPATIENT)
Dept: SURGERY | Facility: CLINIC | Age: 65
End: 2020-06-09

## 2020-06-09 DIAGNOSIS — R31.0 GROSS HEMATURIA: Primary | ICD-10-CM

## 2020-06-09 DIAGNOSIS — R35.0 URINARY FREQUENCY: ICD-10-CM

## 2020-06-09 DIAGNOSIS — Z79.82 ASPIRIN LONG-TERM USE: ICD-10-CM

## 2020-06-09 DIAGNOSIS — R35.1 NOCTURIA: ICD-10-CM

## 2020-06-09 DIAGNOSIS — N40.1 BENIGN PROSTATIC HYPERPLASIA WITH URINARY FREQUENCY: ICD-10-CM

## 2020-06-09 DIAGNOSIS — R97.20 ELEVATED PSA: ICD-10-CM

## 2020-06-09 DIAGNOSIS — R55 VASOVAGAL REACTION: ICD-10-CM

## 2020-06-09 DIAGNOSIS — R35.0 BENIGN PROSTATIC HYPERPLASIA WITH URINARY FREQUENCY: ICD-10-CM

## 2020-06-09 DIAGNOSIS — R30.0 DYSURIA: ICD-10-CM

## 2020-06-09 DIAGNOSIS — R31.29 MICROHEMATURIA: ICD-10-CM

## 2020-06-09 PROCEDURE — 99214 OFFICE O/P EST MOD 30 MIN: CPT | Performed by: UROLOGY

## 2020-06-09 RX ORDER — DIAZEPAM 10 MG/1
TABLET ORAL
Qty: 1 TABLET | Refills: 0 | OUTPATIENT
Start: 2020-06-09 | End: 2022-01-31

## 2020-06-09 NOTE — PATIENT INSTRUCTIONS
Ness Barnes M.D.    1.  Please submit urine specimen for cytology (to check for any tumor cells in the urine); we will notify you of the results whether normal or abnormal    2.   Please schedule CT urogram--we

## 2020-06-09 NOTE — PROGRESS NOTES
Virtual Telephone Check-In    Dossie Josy verbally consents to a Virtual/Telephone Check-In visit on 06/09/20. Patient has been referred to the [facility] website at [email or web address] to review the yearly Consent to Treat document.  This  is  a COVID He considers problem to be mild and stable since PSA is <10.     Voiding Dysfunction  Chronic. Patient had previous AUA score of 21.5, severe voiding dysfunction category; nocturia 1-2x, on 09/20/2019 per chart review.  Today, patient does not complain of n elevated, consider urology evaluation. 5/9/18: \"Dr. Jada Dutta: PSA level is now slightly higher at 7.3; borderline elevated but stable for about 3 years; and see 1 of our urology doctors for further evaluation. \"  05/22/2018 Office visit with me; denies hx negative; Leukocytes = negative   09/28/2019 PSA = 8.38, 13% free PSA (30.3% probability of prostate cancer)   09/20/2019 UA blood = negative; Leukocytes = trace; WBC = 5; RBC = <1   6/24/19 estradiol = 31.0 (NL 11.0-52.5), testosterone = 372.85 (NL 86.98- undergo above outlined treatment and procedure.  I fully discussed with the patient preoperative preparations and post operative care--please see instructions below.     (R31.29) Microhematuria  Please see Gross hematuria above.     (R30.0) Dysuria  Please surgical procedures and will need to hold medication prior to any surgical procedure.      (R55) Vasovagal reaction  After 7/9/2018 office prostate biopsy, patient felt faint, appeared pale, and clammy; stated he was anxious, scared, and had numbness and l documentation. All medical record entries made by the scribe were at my direction and in my presence.   I have reviewed the chart and discharge instructions (if applicable) and agree that the record reflects my personal performance and is accurate and compl

## 2020-06-10 ENCOUNTER — TELEPHONE (OUTPATIENT)
Dept: SURGERY | Facility: CLINIC | Age: 65
End: 2020-06-10

## 2020-06-10 NOTE — TELEPHONE ENCOUNTER
Was asked by NICKOLAS Kelly to phone in pt's diazepam. Phoned order to  pt's [address] , to pharmacist Zee Obando, with verbal order read back confirmation obtained.

## 2020-06-13 ENCOUNTER — APPOINTMENT (OUTPATIENT)
Dept: LAB | Facility: HOSPITAL | Age: 65
End: 2020-06-13
Attending: INTERNAL MEDICINE
Payer: COMMERCIAL

## 2020-06-13 DIAGNOSIS — R35.1 NOCTURIA: ICD-10-CM

## 2020-06-13 PROCEDURE — 81001 URINALYSIS AUTO W/SCOPE: CPT

## 2020-06-27 ENCOUNTER — HOSPITAL ENCOUNTER (OUTPATIENT)
Dept: CT IMAGING | Facility: HOSPITAL | Age: 65
Discharge: HOME | End: 2020-06-27
Attending: UROLOGY
Payer: COMMERCIAL

## 2020-06-27 DIAGNOSIS — R31.0 GROSS HEMATURIA: ICD-10-CM

## 2020-06-27 LAB — CREAT BLD-MCNC: 1.1 MG/DL (ref 0.7–1.3)

## 2020-06-27 PROCEDURE — 76376 3D RENDER W/INTRP POSTPROCES: CPT | Performed by: UROLOGY

## 2020-06-27 PROCEDURE — 82565 ASSAY OF CREATININE: CPT

## 2020-06-27 PROCEDURE — 74178 CT ABD&PLV WO CNTR FLWD CNTR: CPT | Performed by: UROLOGY

## 2020-07-06 ENCOUNTER — TELEPHONE (OUTPATIENT)
Dept: SURGERY | Facility: CLINIC | Age: 65
End: 2020-07-06

## 2020-07-14 NOTE — TELEPHONE ENCOUNTER
RN called this patient to arrange for his cysto appt. Per patient, he forgot that he already set it up. He has an appt on 7/27 Monday at 920am with Dr Lavonne Ozuna. NO questions at this time.

## 2020-07-27 ENCOUNTER — PROCEDURE (OUTPATIENT)
Dept: SURGERY | Facility: CLINIC | Age: 65
End: 2020-07-27
Payer: COMMERCIAL

## 2020-07-27 VITALS
WEIGHT: 190 LBS | HEIGHT: 69 IN | DIASTOLIC BLOOD PRESSURE: 64 MMHG | SYSTOLIC BLOOD PRESSURE: 106 MMHG | BODY MASS INDEX: 28.14 KG/M2 | TEMPERATURE: 98 F | RESPIRATION RATE: 16 BRPM | HEART RATE: 64 BPM

## 2020-07-27 DIAGNOSIS — R31.29 MICROHEMATURIA: ICD-10-CM

## 2020-07-27 DIAGNOSIS — K40.90 INGUINAL HERNIA OF LEFT SIDE WITHOUT OBSTRUCTION OR GANGRENE: ICD-10-CM

## 2020-07-27 DIAGNOSIS — R31.0 GROSS HEMATURIA: Primary | ICD-10-CM

## 2020-07-27 DIAGNOSIS — N28.1 BILATERAL RENAL CYSTS: ICD-10-CM

## 2020-07-27 DIAGNOSIS — R35.0 BENIGN PROSTATIC HYPERPLASIA WITH URINARY FREQUENCY: ICD-10-CM

## 2020-07-27 DIAGNOSIS — R30.0 DYSURIA: ICD-10-CM

## 2020-07-27 DIAGNOSIS — R97.20 ELEVATED PSA: ICD-10-CM

## 2020-07-27 DIAGNOSIS — N40.1 BENIGN PROSTATIC HYPERPLASIA WITH URINARY FREQUENCY: ICD-10-CM

## 2020-07-27 DIAGNOSIS — R35.1 NOCTURIA: ICD-10-CM

## 2020-07-27 DIAGNOSIS — R35.0 URINARY FREQUENCY: ICD-10-CM

## 2020-07-27 DIAGNOSIS — R55 VASOVAGAL REACTION: ICD-10-CM

## 2020-07-27 DIAGNOSIS — Z79.82 ASPIRIN LONG-TERM USE: ICD-10-CM

## 2020-07-27 PROCEDURE — 99213 OFFICE O/P EST LOW 20 MIN: CPT | Performed by: UROLOGY

## 2020-07-27 PROCEDURE — 3078F DIAST BP <80 MM HG: CPT | Performed by: UROLOGY

## 2020-07-27 PROCEDURE — 3074F SYST BP LT 130 MM HG: CPT | Performed by: UROLOGY

## 2020-07-27 PROCEDURE — 52000 CYSTOURETHROSCOPY: CPT | Performed by: UROLOGY

## 2020-07-27 PROCEDURE — 3008F BODY MASS INDEX DOCD: CPT | Performed by: UROLOGY

## 2020-07-27 RX ORDER — CIPROFLOXACIN 500 MG/1
500 TABLET, FILM COATED ORAL ONCE
Status: COMPLETED | OUTPATIENT
Start: 2020-07-27 | End: 2020-07-27

## 2020-07-27 RX ADMIN — CIPROFLOXACIN 500 MG: 500 TABLET, FILM COATED ORAL at 11:24:00

## 2020-07-27 NOTE — PROGRESS NOTES
PREOPERATIVE DIAGNOSIS:     Gross hematuria      POSTOP DIAGNOSIS:               The same;  No tumors, stones, or CIS of the bladder or bladder neck    PROCEDURE:              Cystoscopy            ANESTHESIA:     2% Xylocaine jelly local;  also see above; and generic Phenazopyridine 200 mg 1 daily x 6 days; patient states his urine then became bright orange. Patient states recurrent episodes of gross hematuria 06/05/2020-06/07/2020 with associated dysuria.  He denies associated flank pain with these episodes history of anxiety and depression as per note of Dr. Sherri Viera 5/8/18.      Family history of cancer  Patient states that his father passed away from lung cancer and his maternal and paternal grandfather also diagnosed with cancer, but not aware which type. prostate 2-3+ enlarged, no palpable nodules or indurations; discussed starting finasteride, patient will do research and consider starting the medication; PSA and urinanalysis ordered  The patient was transgender, transitioning male to female  09/20/2019 O into inguinal canal bilaterally           In light of the above urological history, and in light of the above urological problems,   I explained the patient the following treatment options :    DIAGNOSES & DISCUSSION    (R31.0) Gross hematuria  (primary en hematuria, please refer patient back to see me as soon as possible. If microhematuria persists at the same level and is still present 3 years from now, please consider repeat urological evaluation. I would be happy to see patient again at any point.  I have 09/20/2019 per chart review. Today, patient complains of nocturia 0-1x. He is currently taking spironolactone which he knows increases urinary frequency. Patient is not taking any medication for this.  We discussed starting tamsulosin 0.4 mg daily vs alfuzo amount of liquids. Once there are no signs of blood, you can   take NSAIDs such as Advil, Motrin, Aleve, ibuprofen if you need to    2.  If you have burning with urination, prescription phenazopyridine 200 mg capsule 3 times daily for burning pain with urin MD,  personally performed the services described in this documentation. All medical record entries made by the scribe were at my direction and in my presence.   I have reviewed the chart and discharge instructions (if applicable) and agree that the record r

## 2020-07-27 NOTE — PATIENT INSTRUCTIONS
Jatinder Gonzalez M.D.       1. If your urine is bloody, please drink enough liquids to dilute out the blood. If the urine doesn't show any signs of blood, you can drink your usual amount of liquids.  Once there are no sign

## 2020-12-01 ENCOUNTER — TELEPHONE (OUTPATIENT)
Dept: INTERNAL MEDICINE CLINIC | Facility: CLINIC | Age: 65
End: 2020-12-01

## 2020-12-01 DIAGNOSIS — Z00.00 ROUTINE PHYSICAL EXAMINATION: Primary | ICD-10-CM

## 2020-12-01 NOTE — TELEPHONE ENCOUNTER
Patient requesting lab orders to be placed for his physical would like to complete them before coming in. Patient will be leaving out of town after he completes his yearly check up. Please advise.

## 2020-12-01 NOTE — TELEPHONE ENCOUNTER
Dr. Dillon Members, patient is schedule for a Physical on 12/21/2020. Patient is requesting blood test order for appointment. Please advise.

## 2020-12-02 NOTE — TELEPHONE ENCOUNTER
Contacted patient, informed message below. Pt verbalized understanding.  Advised pt to make appt for labs at [phone]

## 2020-12-04 ENCOUNTER — LAB ENCOUNTER (OUTPATIENT)
Dept: LAB | Facility: HOSPITAL | Age: 65
End: 2020-12-04
Attending: INTERNAL MEDICINE
Payer: COMMERCIAL

## 2020-12-04 DIAGNOSIS — R97.20 ELEVATED PSA: ICD-10-CM

## 2020-12-04 DIAGNOSIS — E34.9 ENDOCRINE DISORDER: Primary | ICD-10-CM

## 2020-12-04 DIAGNOSIS — Z00.00 ROUTINE PHYSICAL EXAMINATION: ICD-10-CM

## 2020-12-04 PROCEDURE — 84403 ASSAY OF TOTAL TESTOSTERONE: CPT

## 2020-12-04 PROCEDURE — 82670 ASSAY OF TOTAL ESTRADIOL: CPT

## 2020-12-04 PROCEDURE — 83036 HEMOGLOBIN GLYCOSYLATED A1C: CPT

## 2020-12-04 PROCEDURE — 84153 ASSAY OF PSA TOTAL: CPT

## 2020-12-04 PROCEDURE — 84154 ASSAY OF PSA FREE: CPT

## 2020-12-04 PROCEDURE — 85025 COMPLETE CBC W/AUTO DIFF WBC: CPT

## 2020-12-04 PROCEDURE — 36415 COLL VENOUS BLD VENIPUNCTURE: CPT

## 2020-12-04 PROCEDURE — 84443 ASSAY THYROID STIM HORMONE: CPT

## 2020-12-04 PROCEDURE — 80053 COMPREHEN METABOLIC PANEL: CPT

## 2020-12-04 PROCEDURE — 80061 LIPID PANEL: CPT

## 2020-12-20 NOTE — PROGRESS NOTES
HPI:    Patient ID: Byron Yuen is a 60year old male. HPI  Patient is here requesting a physical exam.  Last seen here about a year ago also for physical exam.  At that time no major issues. Depression and anxiety was well controlled.   Following • ELECTROCARDIOGRAM, COMPLETE  11/6/2011    scanned to media tab   • NM HEPATOBILIARY SCAN  (VWR=[id number])  11/16/2011    Per NG: laparoscopic cholecystectomy      Family History   Problem Relation Age of Onset   • Cancer Father         Per NG: lung -smoker light. Conjunctivae and EOM are normal.   Neck: No thyromegaly present. Cardiovascular: Normal rate, regular rhythm, normal heart sounds and intact distal pulses. Exam reveals no gallop and no friction rub. No murmur heard. Edema not present. Nic Grossman medications at this time. 3. Gender dysphoria  Patient continues to proceed with the gender transition process. Started the estradiol a couple of months ago. Had some side effects with breast tenderness.   Is following up closely with a specialist.  Has

## 2020-12-21 ENCOUNTER — OFFICE VISIT (OUTPATIENT)
Dept: INTERNAL MEDICINE CLINIC | Facility: CLINIC | Age: 65
End: 2020-12-21
Payer: COMMERCIAL

## 2020-12-21 VITALS
BODY MASS INDEX: 28.25 KG/M2 | HEART RATE: 77 BPM | WEIGHT: 197.31 LBS | DIASTOLIC BLOOD PRESSURE: 75 MMHG | HEIGHT: 70 IN | SYSTOLIC BLOOD PRESSURE: 110 MMHG

## 2020-12-21 DIAGNOSIS — E78.5 HYPERLIPIDEMIA, UNSPECIFIED HYPERLIPIDEMIA TYPE: ICD-10-CM

## 2020-12-21 DIAGNOSIS — Z00.00 ROUTINE PHYSICAL EXAMINATION: Primary | ICD-10-CM

## 2020-12-21 DIAGNOSIS — F64.9 GENDER DYSPHORIA: ICD-10-CM

## 2020-12-21 PROCEDURE — 99396 PREV VISIT EST AGE 40-64: CPT | Performed by: INTERNAL MEDICINE

## 2020-12-21 PROCEDURE — 3074F SYST BP LT 130 MM HG: CPT | Performed by: INTERNAL MEDICINE

## 2020-12-21 PROCEDURE — 3078F DIAST BP <80 MM HG: CPT | Performed by: INTERNAL MEDICINE

## 2020-12-21 PROCEDURE — 3008F BODY MASS INDEX DOCD: CPT | Performed by: INTERNAL MEDICINE

## 2020-12-21 RX ORDER — ESTRADIOL 1.25 MG/1.25G
1 GEL TOPICAL DAILY
COMMUNITY
Start: 2020-11-30

## 2022-01-31 ENCOUNTER — OFFICE VISIT (OUTPATIENT)
Dept: INTERNAL MEDICINE CLINIC | Facility: CLINIC | Age: 67
End: 2022-01-31
Payer: COMMERCIAL

## 2022-01-31 VITALS
HEART RATE: 80 BPM | SYSTOLIC BLOOD PRESSURE: 110 MMHG | DIASTOLIC BLOOD PRESSURE: 68 MMHG | RESPIRATION RATE: 20 BRPM | WEIGHT: 204 LBS | BODY MASS INDEX: 29.2 KG/M2 | HEIGHT: 70 IN | TEMPERATURE: 98 F

## 2022-01-31 DIAGNOSIS — F41.9 ANXIETY AND DEPRESSION: ICD-10-CM

## 2022-01-31 DIAGNOSIS — F64.0 GENDER DYSPHORIA IN ADULT: ICD-10-CM

## 2022-01-31 DIAGNOSIS — E78.5 HYPERLIPIDEMIA, UNSPECIFIED HYPERLIPIDEMIA TYPE: ICD-10-CM

## 2022-01-31 DIAGNOSIS — F32.A ANXIETY AND DEPRESSION: ICD-10-CM

## 2022-01-31 DIAGNOSIS — Z00.00 ROUTINE PHYSICAL EXAMINATION: Primary | ICD-10-CM

## 2022-01-31 PROCEDURE — 3074F SYST BP LT 130 MM HG: CPT | Performed by: INTERNAL MEDICINE

## 2022-01-31 PROCEDURE — 3078F DIAST BP <80 MM HG: CPT | Performed by: INTERNAL MEDICINE

## 2022-01-31 PROCEDURE — 99397 PER PM REEVAL EST PAT 65+ YR: CPT | Performed by: INTERNAL MEDICINE

## 2022-01-31 PROCEDURE — 3008F BODY MASS INDEX DOCD: CPT | Performed by: INTERNAL MEDICINE

## 2022-01-31 RX ORDER — MELOXICAM 7.5 MG/1
7.5 TABLET ORAL DAILY
COMMUNITY
Start: 2022-01-29

## 2022-01-31 NOTE — PROGRESS NOTES
HPI:    Patient ID: Chan Jacobsen is a 73year old adult. HPI  Patient is here requesting physical exam and follow-up on chronic medical issues as listed below. Last seen in the office in December 2020.   Underlying gender dysphoria, elevated choles • CHOLECYSTECTOMY     • COLONOSCOPY     • COLONOSCOPY N/A 5/18/2020    Procedure: COLONOSCOPY;  Surgeon: Kaaren Baumgarten, MD;  Location: [address] [place] [address] ENDOSCOPY   • ELECTROCARDIOGRAM, COMPLETE  11/6/2011    scanned to media tab   • NM HEPATOBILIARY SCAN  (CPT 80   Temp 98 °F (36.7 °C) (Other)   Resp 20   Ht 5' 10\" (1.778 m)   Wt 204 lb (92.5 kg)   BMI 29.27 kg/m²      Physical Exam  HENT:      Right Ear: Tympanic membrane and ear canal normal.      Left Ear: Tympanic membrane and ear canal normal.      Nose: N kg)  09/20/19 : 181 lb (82.1 kg)            ASSESSMENT/PLAN:   1. Routine physical examination  Physical exam shows no worrisome abnormalities. I am concerned about the weight gain. This may have been worsened by the hormonal treatment.   Nevertheless, pa

## 2022-02-05 ENCOUNTER — LAB ENCOUNTER (OUTPATIENT)
Dept: LAB | Facility: HOSPITAL | Age: 67
End: 2022-02-05
Attending: INTERNAL MEDICINE
Payer: COMMERCIAL

## 2022-02-05 DIAGNOSIS — Z00.00 ROUTINE PHYSICAL EXAMINATION: ICD-10-CM

## 2022-02-05 LAB
ALBUMIN SERPL-MCNC: 3.9 G/DL (ref 3.4–5)
ALBUMIN/GLOB SERPL: 1.4 {RATIO} (ref 1–2)
ALP LIVER SERPL-CCNC: 49 U/L
ALT SERPL-CCNC: 32 U/L
ANION GAP SERPL CALC-SCNC: 8 MMOL/L (ref 0–18)
AST SERPL-CCNC: 24 U/L (ref 15–37)
BASOPHILS # BLD AUTO: 0.06 X10(3) UL (ref 0–0.2)
BASOPHILS NFR BLD AUTO: 1.2 %
BILIRUB SERPL-MCNC: 1.1 MG/DL (ref 0.1–2)
BUN BLD-MCNC: 18 MG/DL (ref 7–18)
BUN/CREAT SERPL: 18.4 (ref 10–20)
CALCIUM BLD-MCNC: 8.9 MG/DL (ref 8.5–10.1)
CHLORIDE SERPL-SCNC: 107 MMOL/L (ref 98–112)
CHOLEST SERPL-MCNC: 186 MG/DL (ref ?–200)
CO2 SERPL-SCNC: 26 MMOL/L (ref 21–32)
COMPLEXED PSA SERPL-MCNC: 6.97 NG/ML (ref ?–4)
CREAT BLD-MCNC: 0.98 MG/DL
DEPRECATED RDW RBC AUTO: 39.8 FL (ref 35.1–46.3)
EOSINOPHIL # BLD AUTO: 0.12 X10(3) UL (ref 0–0.7)
EOSINOPHIL NFR BLD AUTO: 2.5 %
ERYTHROCYTE [DISTWIDTH] IN BLOOD BY AUTOMATED COUNT: 12 % (ref 11–15)
EST. AVERAGE GLUCOSE BLD GHB EST-MCNC: 108 MG/DL (ref 68–126)
FASTING PATIENT LIPID ANSWER: YES
FASTING STATUS PATIENT QL REPORTED: YES
GLOBULIN PLAS-MCNC: 2.7 G/DL (ref 2.8–4.4)
GLUCOSE BLD-MCNC: 96 MG/DL (ref 70–99)
HBA1C MFR BLD: 5.4 % (ref ?–5.7)
HCT VFR BLD AUTO: 43.5 %
HDLC SERPL-MCNC: 44 MG/DL (ref 40–59)
HGB BLD-MCNC: 14.9 G/DL
IMM GRANULOCYTES # BLD AUTO: 0.01 X10(3) UL (ref 0–1)
IMM GRANULOCYTES NFR BLD: 0.2 %
LDLC SERPL CALC-MCNC: 128 MG/DL (ref ?–100)
LYMPHOCYTES # BLD AUTO: 1.17 X10(3) UL (ref 1–4)
MCH RBC QN AUTO: 30.7 PG (ref 26–34)
MCHC RBC AUTO-ENTMCNC: 34.3 G/DL (ref 31–37)
MCV RBC AUTO: 89.5 FL
MONOCYTES # BLD AUTO: 0.4 X10(3) UL (ref 0.1–1)
MONOCYTES NFR BLD AUTO: 8.2 %
NEUTROPHILS # BLD AUTO: 3.11 X10 (3) UL (ref 1.5–7.7)
NEUTROPHILS # BLD AUTO: 3.11 X10(3) UL (ref 1.5–7.7)
NEUTROPHILS NFR BLD AUTO: 63.9 %
NONHDLC SERPL-MCNC: 142 MG/DL (ref ?–130)
OSMOLALITY SERPL CALC.SUM OF ELEC: 294 MOSM/KG (ref 275–295)
POTASSIUM SERPL-SCNC: 4.5 MMOL/L (ref 3.5–5.1)
PROT SERPL-MCNC: 6.6 G/DL (ref 6.4–8.2)
RBC # BLD AUTO: 4.86 X10(6)UL
SODIUM SERPL-SCNC: 141 MMOL/L (ref 136–145)
TRIGL SERPL-MCNC: 74 MG/DL (ref 30–149)
TSI SER-ACNC: 1.07 MIU/ML (ref 0.36–3.74)
VIT B12 SERPL-MCNC: 317 PG/ML (ref 193–986)
VLDLC SERPL CALC-MCNC: 13 MG/DL (ref 0–30)
WBC # BLD AUTO: 4.9 X10(3) UL (ref 4–11)

## 2022-02-05 PROCEDURE — 80061 LIPID PANEL: CPT

## 2022-02-05 PROCEDURE — 36415 COLL VENOUS BLD VENIPUNCTURE: CPT

## 2022-02-05 PROCEDURE — 85025 COMPLETE CBC W/AUTO DIFF WBC: CPT

## 2022-02-05 PROCEDURE — 80053 COMPREHEN METABOLIC PANEL: CPT

## 2022-02-05 PROCEDURE — 82607 VITAMIN B-12: CPT

## 2022-02-05 PROCEDURE — 83036 HEMOGLOBIN GLYCOSYLATED A1C: CPT

## 2022-02-05 PROCEDURE — 84443 ASSAY THYROID STIM HORMONE: CPT

## 2022-05-28 ENCOUNTER — LAB ENCOUNTER (OUTPATIENT)
Dept: LAB | Facility: HOSPITAL | Age: 67
End: 2022-05-28
Attending: FAMILY MEDICINE
Payer: COMMERCIAL

## 2022-05-28 DIAGNOSIS — E34.9 ENDOCRINE DISORDER: Primary | ICD-10-CM

## 2022-05-28 LAB
ALBUMIN SERPL-MCNC: 3.7 G/DL (ref 3.4–5)
ALBUMIN/GLOB SERPL: 1.3 {RATIO} (ref 1–2)
ALP LIVER SERPL-CCNC: 46 U/L
ALT SERPL-CCNC: 27 U/L
ANION GAP SERPL CALC-SCNC: 5 MMOL/L (ref 0–18)
AST SERPL-CCNC: 22 U/L (ref 15–37)
BASOPHILS # BLD AUTO: 0.04 X10(3) UL (ref 0–0.2)
BASOPHILS NFR BLD AUTO: 0.8 %
BILIRUB SERPL-MCNC: 1.2 MG/DL (ref 0.1–2)
BUN BLD-MCNC: 20 MG/DL (ref 7–18)
BUN/CREAT SERPL: 20.6 (ref 10–20)
CALCIUM BLD-MCNC: 8.9 MG/DL (ref 8.5–10.1)
CHLORIDE SERPL-SCNC: 108 MMOL/L (ref 98–112)
CHOLEST SERPL-MCNC: 148 MG/DL (ref ?–200)
CO2 SERPL-SCNC: 28 MMOL/L (ref 21–32)
CREAT BLD-MCNC: 0.97 MG/DL
DEPRECATED RDW RBC AUTO: 40.3 FL (ref 35.1–46.3)
EOSINOPHIL # BLD AUTO: 0.11 X10(3) UL (ref 0–0.7)
EOSINOPHIL NFR BLD AUTO: 2.3 %
ERYTHROCYTE [DISTWIDTH] IN BLOOD BY AUTOMATED COUNT: 12.3 % (ref 11–15)
ESTRADIOL SERPL-MCNC: 618.5 PG/ML
FASTING PATIENT LIPID ANSWER: NO
FASTING STATUS PATIENT QL REPORTED: NO
GLOBULIN PLAS-MCNC: 2.8 G/DL (ref 2.8–4.4)
GLUCOSE BLD-MCNC: 63 MG/DL (ref 70–99)
HCT VFR BLD AUTO: 42.8 %
HDLC SERPL-MCNC: 44 MG/DL (ref 40–59)
HGB BLD-MCNC: 14.3 G/DL
IMM GRANULOCYTES # BLD AUTO: 0.01 X10(3) UL (ref 0–1)
IMM GRANULOCYTES NFR BLD: 0.2 %
LDLC SERPL CALC-MCNC: 89 MG/DL (ref ?–100)
LYMPHOCYTES # BLD AUTO: 1.12 X10(3) UL (ref 1–4)
LYMPHOCYTES NFR BLD AUTO: 23.3 %
MCH RBC QN AUTO: 30 PG (ref 26–34)
MCHC RBC AUTO-ENTMCNC: 33.4 G/DL (ref 31–37)
MCV RBC AUTO: 89.7 FL
MONOCYTES # BLD AUTO: 0.46 X10(3) UL (ref 0.1–1)
MONOCYTES NFR BLD AUTO: 9.6 %
NEUTROPHILS # BLD AUTO: 3.06 X10 (3) UL (ref 1.5–7.7)
NEUTROPHILS # BLD AUTO: 3.06 X10(3) UL (ref 1.5–7.7)
NEUTROPHILS NFR BLD AUTO: 63.8 %
NONHDLC SERPL-MCNC: 104 MG/DL (ref ?–130)
OSMOLALITY SERPL CALC.SUM OF ELEC: 293 MOSM/KG (ref 275–295)
PLATELET # BLD AUTO: 233 10(3)UL (ref 150–450)
POTASSIUM SERPL-SCNC: 3.9 MMOL/L (ref 3.5–5.1)
PROT SERPL-MCNC: 6.5 G/DL (ref 6.4–8.2)
RBC # BLD AUTO: 4.77 X10(6)UL
SODIUM SERPL-SCNC: 141 MMOL/L (ref 136–145)
TESTOST SERPL-MCNC: 31.17 NG/DL
TRIGL SERPL-MCNC: 75 MG/DL (ref 30–149)
TSI SER-ACNC: 1.11 MIU/ML (ref 0.36–3.74)
VLDLC SERPL CALC-MCNC: 12 MG/DL (ref 0–30)
WBC # BLD AUTO: 4.8 X10(3) UL (ref 4–11)

## 2022-05-28 PROCEDURE — 84443 ASSAY THYROID STIM HORMONE: CPT

## 2022-05-28 PROCEDURE — 82670 ASSAY OF TOTAL ESTRADIOL: CPT

## 2022-05-28 PROCEDURE — 80061 LIPID PANEL: CPT

## 2022-05-28 PROCEDURE — 36415 COLL VENOUS BLD VENIPUNCTURE: CPT

## 2022-05-28 PROCEDURE — 84403 ASSAY OF TOTAL TESTOSTERONE: CPT

## 2022-05-28 PROCEDURE — 80053 COMPREHEN METABOLIC PANEL: CPT

## 2022-05-28 PROCEDURE — 85025 COMPLETE CBC W/AUTO DIFF WBC: CPT

## 2023-02-03 ENCOUNTER — OFFICE VISIT (OUTPATIENT)
Dept: INTERNAL MEDICINE CLINIC | Facility: CLINIC | Age: 68
End: 2023-02-03

## 2023-02-03 VITALS
HEIGHT: 68.5 IN | WEIGHT: 198 LBS | TEMPERATURE: 98 F | RESPIRATION RATE: 18 BRPM | DIASTOLIC BLOOD PRESSURE: 72 MMHG | HEART RATE: 72 BPM | SYSTOLIC BLOOD PRESSURE: 110 MMHG | BODY MASS INDEX: 29.66 KG/M2

## 2023-02-03 DIAGNOSIS — F41.9 ANXIETY AND DEPRESSION: ICD-10-CM

## 2023-02-03 DIAGNOSIS — Z00.00 ENCOUNTER FOR ANNUAL HEALTH EXAMINATION: Primary | ICD-10-CM

## 2023-02-03 DIAGNOSIS — F64.0 GENDER DYSPHORIA IN ADULT: ICD-10-CM

## 2023-02-03 DIAGNOSIS — E78.5 HYPERLIPIDEMIA, UNSPECIFIED HYPERLIPIDEMIA TYPE: ICD-10-CM

## 2023-02-03 DIAGNOSIS — F32.A ANXIETY AND DEPRESSION: ICD-10-CM

## 2023-02-03 DIAGNOSIS — Z12.5 SCREENING FOR PROSTATE CANCER: ICD-10-CM

## 2023-02-03 PROCEDURE — 99397 PER PM REEVAL EST PAT 65+ YR: CPT | Performed by: INTERNAL MEDICINE

## 2023-02-03 PROCEDURE — 3008F BODY MASS INDEX DOCD: CPT | Performed by: INTERNAL MEDICINE

## 2023-02-03 PROCEDURE — G0402 INITIAL PREVENTIVE EXAM: HCPCS | Performed by: INTERNAL MEDICINE

## 2023-02-03 PROCEDURE — 3074F SYST BP LT 130 MM HG: CPT | Performed by: INTERNAL MEDICINE

## 2023-02-03 PROCEDURE — 3078F DIAST BP <80 MM HG: CPT | Performed by: INTERNAL MEDICINE

## 2023-02-03 PROCEDURE — 96160 PT-FOCUSED HLTH RISK ASSMT: CPT | Performed by: INTERNAL MEDICINE

## 2023-02-03 PROCEDURE — 1126F AMNT PAIN NOTED NONE PRSNT: CPT | Performed by: INTERNAL MEDICINE

## 2023-02-03 RX ORDER — RED YEAST RICE EXTRACT
1 POWDER (GRAM) MISCELLANEOUS DAILY
COMMUNITY

## 2023-02-03 RX ORDER — CLINDAMYCIN PHOSPHATE 11.9 MG/ML
1 SOLUTION TOPICAL 2 TIMES DAILY
COMMUNITY
Start: 2022-04-11 | End: 2023-02-03 | Stop reason: ALTCHOICE

## 2023-02-03 RX ORDER — NEEDLES, DISPOSABLE 25GX5/8"
NEEDLE, DISPOSABLE MISCELLANEOUS
COMMUNITY
Start: 2022-11-30

## 2023-02-03 RX ORDER — SYRINGE, DISPOSABLE, 1 ML
1 SYRINGE, EMPTY DISPOSABLE MISCELLANEOUS AS DIRECTED
COMMUNITY
Start: 2022-12-01

## 2023-02-12 ENCOUNTER — LAB ENCOUNTER (OUTPATIENT)
Dept: LAB | Facility: HOSPITAL | Age: 68
End: 2023-02-12
Attending: INTERNAL MEDICINE
Payer: MEDICARE

## 2023-02-12 DIAGNOSIS — Z12.5 SCREENING FOR PROSTATE CANCER: ICD-10-CM

## 2023-02-12 DIAGNOSIS — E78.5 HYPERLIPIDEMIA, UNSPECIFIED HYPERLIPIDEMIA TYPE: ICD-10-CM

## 2023-02-12 DIAGNOSIS — Z00.00 ENCOUNTER FOR ANNUAL HEALTH EXAMINATION: ICD-10-CM

## 2023-02-12 LAB
ALBUMIN SERPL-MCNC: 3.7 G/DL (ref 3.4–5)
ALBUMIN/GLOB SERPL: 1.2 {RATIO} (ref 1–2)
ALP LIVER SERPL-CCNC: 49 U/L
ALT SERPL-CCNC: 29 U/L
ANION GAP SERPL CALC-SCNC: 4 MMOL/L (ref 0–18)
AST SERPL-CCNC: 20 U/L (ref 15–37)
BASOPHILS # BLD AUTO: 0.05 X10(3) UL (ref 0–0.2)
BASOPHILS NFR BLD AUTO: 0.8 %
BILIRUB SERPL-MCNC: 0.7 MG/DL (ref 0.1–2)
BUN BLD-MCNC: 17 MG/DL (ref 7–18)
BUN/CREAT SERPL: 16.5 (ref 10–20)
CALCIUM BLD-MCNC: 8.8 MG/DL (ref 8.5–10.1)
CHLORIDE SERPL-SCNC: 110 MMOL/L (ref 98–112)
CHOLEST SERPL-MCNC: 189 MG/DL (ref ?–200)
CO2 SERPL-SCNC: 28 MMOL/L (ref 21–32)
COMPLEXED PSA SERPL-MCNC: 8.13 NG/ML (ref ?–4)
CREAT BLD-MCNC: 1.03 MG/DL
DEPRECATED RDW RBC AUTO: 41.3 FL (ref 35.1–46.3)
EOSINOPHIL # BLD AUTO: 0.22 X10(3) UL (ref 0–0.7)
EOSINOPHIL NFR BLD AUTO: 3.6 %
ERYTHROCYTE [DISTWIDTH] IN BLOOD BY AUTOMATED COUNT: 12.6 % (ref 11–15)
FASTING PATIENT LIPID ANSWER: YES
FASTING STATUS PATIENT QL REPORTED: YES
GFR SERPLBLD BASED ON 1.73 SQ M-ARVRAT: 80 ML/MIN/1.73M2 (ref 60–?)
GLOBULIN PLAS-MCNC: 3 G/DL (ref 2.8–4.4)
GLUCOSE BLD-MCNC: 103 MG/DL (ref 70–99)
HCT VFR BLD AUTO: 43.6 %
HDLC SERPL-MCNC: 53 MG/DL (ref 40–59)
HGB BLD-MCNC: 14.9 G/DL
IMM GRANULOCYTES # BLD AUTO: 0.02 X10(3) UL (ref 0–1)
IMM GRANULOCYTES NFR BLD: 0.3 %
LDLC SERPL CALC-MCNC: 125 MG/DL (ref ?–100)
LYMPHOCYTES # BLD AUTO: 1.24 X10(3) UL (ref 1–4)
LYMPHOCYTES NFR BLD AUTO: 20.3 %
MCH RBC QN AUTO: 30.7 PG (ref 26–34)
MCHC RBC AUTO-ENTMCNC: 34.2 G/DL (ref 31–37)
MCV RBC AUTO: 89.7 FL
MONOCYTES # BLD AUTO: 0.39 X10(3) UL (ref 0.1–1)
MONOCYTES NFR BLD AUTO: 6.4 %
NEUTROPHILS # BLD AUTO: 4.19 X10 (3) UL (ref 1.5–7.7)
NEUTROPHILS # BLD AUTO: 4.19 X10(3) UL (ref 1.5–7.7)
NEUTROPHILS NFR BLD AUTO: 68.6 %
NONHDLC SERPL-MCNC: 136 MG/DL (ref ?–130)
OSMOLALITY SERPL CALC.SUM OF ELEC: 296 MOSM/KG (ref 275–295)
PLATELET # BLD AUTO: 273 10(3)UL (ref 150–450)
POTASSIUM SERPL-SCNC: 4.2 MMOL/L (ref 3.5–5.1)
PROT SERPL-MCNC: 6.7 G/DL (ref 6.4–8.2)
RBC # BLD AUTO: 4.86 X10(6)UL
SODIUM SERPL-SCNC: 142 MMOL/L (ref 136–145)
TRIGL SERPL-MCNC: 57 MG/DL (ref 30–149)
TSI SER-ACNC: 1.12 MIU/ML (ref 0.36–3.74)
VLDLC SERPL CALC-MCNC: 10 MG/DL (ref 0–30)
WBC # BLD AUTO: 6.1 X10(3) UL (ref 4–11)

## 2023-02-12 PROCEDURE — 80061 LIPID PANEL: CPT

## 2023-02-12 PROCEDURE — 84443 ASSAY THYROID STIM HORMONE: CPT

## 2023-02-12 PROCEDURE — 85025 COMPLETE CBC W/AUTO DIFF WBC: CPT

## 2023-02-12 PROCEDURE — 36415 COLL VENOUS BLD VENIPUNCTURE: CPT

## 2023-02-12 PROCEDURE — 80053 COMPREHEN METABOLIC PANEL: CPT

## 2023-03-20 ENCOUNTER — TELEPHONE (OUTPATIENT)
Dept: INTERNAL MEDICINE CLINIC | Facility: CLINIC | Age: 68
End: 2023-03-20

## 2023-03-20 NOTE — TELEPHONE ENCOUNTER
Zhao Anne calling from [facility] [employer] for patients labs, recent office notes and last 3 PSA levels    Fax: [phone]

## 2023-03-20 NOTE — TELEPHONE ENCOUNTER
PATIENT RECENT LABS INCLUDING LAST PSA RESULTS ALONG WITH PHYSICIAN PROGRESS NOTES FROM LAST OFFICE VISIT FAXED TO [facility] AS REQUESTED [phone]. PATIENT CONTACTED AND VERBAL CONSENT  RECEIVED.

## 2024-04-10 ENCOUNTER — OFFICE VISIT (OUTPATIENT)
Dept: INTERNAL MEDICINE CLINIC | Facility: CLINIC | Age: 69
End: 2024-04-10
Payer: MEDICARE

## 2024-04-10 VITALS
TEMPERATURE: 98 F | HEART RATE: 82 BPM | RESPIRATION RATE: 18 BRPM | DIASTOLIC BLOOD PRESSURE: 62 MMHG | SYSTOLIC BLOOD PRESSURE: 106 MMHG | HEIGHT: 68.5 IN | WEIGHT: 201 LBS | BODY MASS INDEX: 30.11 KG/M2

## 2024-04-10 DIAGNOSIS — F32.A ANXIETY AND DEPRESSION: ICD-10-CM

## 2024-04-10 DIAGNOSIS — F64.0 GENDER DYSPHORIA IN ADULT: ICD-10-CM

## 2024-04-10 DIAGNOSIS — E78.5 HYPERLIPIDEMIA, UNSPECIFIED HYPERLIPIDEMIA TYPE: ICD-10-CM

## 2024-04-10 DIAGNOSIS — Z00.00 ENCOUNTER FOR ANNUAL HEALTH EXAMINATION: Primary | ICD-10-CM

## 2024-04-10 DIAGNOSIS — F41.9 ANXIETY AND DEPRESSION: ICD-10-CM

## 2024-04-10 DIAGNOSIS — R97.20 ELEVATED PSA: ICD-10-CM

## 2024-04-10 RX ORDER — TAMSULOSIN HYDROCHLORIDE 0.4 MG/1
0.4 CAPSULE ORAL DAILY
COMMUNITY
Start: 2023-07-01

## 2024-04-10 RX ORDER — SERTRALINE HYDROCHLORIDE 100 MG/1
150 TABLET, FILM COATED ORAL DAILY
COMMUNITY

## 2024-04-10 RX ORDER — SYRINGE, DISPOSABLE, 1 ML
SYRINGE, EMPTY DISPOSABLE MISCELLANEOUS
COMMUNITY
Start: 2024-02-13 | End: 2024-04-10

## 2024-04-10 NOTE — PATIENT INSTRUCTIONS
Check on whether or not you need the Pneumonia shot. This would be the Prevnar 20 shot.        Emil Askew's SCREENING SCHEDULE   Tests on this list are recommended by your physician but may not be covered, or covered at this frequency, by your insurer.   Please check with your insurance carrier before scheduling to verify coverage.   PREVENTATIVE SERVICES FREQUENCY &  COVERAGE DETAILS LAST COMPLETION DATE   Diabetes Screening    Fasting Blood Sugar /  Glucose    One screening every 12 months if never tested or if previously tested but not diagnosed with pre-diabetes   One screening every 6 months if diagnosed with pre-diabetes Lab Results   Component Value Date     (H) 02/12/2023        Cardiovascular Disease Screening    Lipid Panel  Cholesterol  Lipoprotein (HDL)  Triglycerides Covered every 5 years for all Medicare beneficiaries without apparent signs or symptoms of cardiovascular disease Lab Results   Component Value Date    CHOLEST 189 02/12/2023    HDL 53 02/12/2023     (H) 02/12/2023    TRIG 57 02/12/2023         Electrocardiogram (EKG)   Covered if needed at Welcome to Medicare, and non-screening if indicated for medical reasons 07/09/2018      Ultrasound Screening for Abdominal Aortic Aneurysm (AAA) Covered once in a lifetime for one of the following risk factors    Men who are 65-75 years old and have ever smoked    Anyone with a family history -     Colorectal Cancer Screening  Covered for ages 50-85; only need ONE of the following:    Colonoscopy   Covered every 10 years    Covered every 2 years if patient is at high risk or previous colonoscopy was abnormal 05/18/2020    Health Maintenance   Topic Date Due    Colorectal Cancer Screening  05/18/2025       Flexible Sigmoidoscopy   Covered every 4 years -    Fecal Occult Blood Test Covered annually -   Bone Density Screening    Bone density screening    Covered every 2 years after age 65 if diagnosed with risk of osteoporosis or  estrogen deficiency.    Covered yearly for long-term glucocorticoid medication use (Steroids) No results found for this or any previous visit.      Health Maintenance Due   Topic Date Due    DEXA Scan  Never done      Pap and Pelvic    Pap   Covered every 2 years for women at normal risk; Annually if at high risk -  No recommendations at this time    Chlamydia Annually if high risk -  No recommendations at this time   Screening Mammogram    Mammogram     Recommend annually for all female patients aged 40 and older    One baseline mammogram covered for patients aged 35-39 -    Health Maintenance   Topic Date Due    Mammogram  Never done       Immunizations    Influenza Covered once per flu season  Please get every year 09/29/2023  No recommendations at this time    Pneumococcal Each vaccine (Ehyvnwo37 & Dervzukpb18) covered once after 65 Prevnar 13: 10/24/2020    [id number]: -     Pneumococcal Vaccination(2 of 2 - PPSV23 or PCV20) due on 10/24/2021    Hepatitis B One screening covered for patients with certain risk factors   -  No recommendations at this time    Tetanus Toxoid Not covered by Medicare Part B unless medically necessary (cut with metal); may be covered with your pharmacy prescription benefits -    Tetanus, Diptheria and Pertusis TD and TDaP Not covered by Medicare Part B -  No recommendations at this time    Zoster Not covered by Medicare Part B; may be covered with your pharmacy  prescription benefits 09/23/2017  No recommendations at this time      Emil Askew's SCREENING SCHEDULE   Tests on this list are recommended by your physician but may not be covered, or covered at this frequency, by your insurer.   Please check with your insurance carrier before scheduling to verify coverage.   PREVENTATIVE SERVICES FREQUENCY &  COVERAGE DETAILS LAST COMPLETION DATE   Diabetes Screening    Fasting Blood Sugar / Glucose    One screening every 12 months if never tested or if previously tested but not  diagnosed with pre-diabetes   One screening every 6 months if diagnosed with pre-diabetes Lab Results   Component Value Date     (H) 02/12/2023        Cardiovascular Disease Screening    Lipid Panel  Cholesterol  Lipoprotein (HDL)  Triglycerides Covered every 5 years for all Medicare beneficiaries without apparent signs or symptoms of cardiovascular disease Lab Results   Component Value Date    CHOLEST 189 02/12/2023    HDL 53 02/12/2023     (H) 02/12/2023    TRIG 57 02/12/2023         Electrocardiogram (EKG)   Covered if needed at Welcome to Medicare, and non-screening if indicated for medical reasons 07/09/2018      Ultrasound Screening for Abdominal Aortic Aneurysm (AAA) Covered once in a lifetime for one of the following risk factors    Men who are 65-75 years old and have ever smoked    Anyone with a family history -     Colorectal Cancer Screening  Covered for ages 50-85; only need ONE of the following:    Colonoscopy   Covered every 10 years    Covered every 2 years if patient is at high risk or previous colonoscopy was abnormal 05/18/2020    Health Maintenance   Topic Date Due    Colorectal Cancer Screening  05/18/2025       Flexible Sigmoidoscopy   Covered every 4 years -    Fecal Occult Blood Test Covered annually -   Prostate Cancer Screening    Prostate-Specific Antigen (PSA) Annually Lab Results   Component Value Date    PSA 7.61 (H) 12/04/2020     Health Maintenance   Topic Date Due    PSA  02/12/2025      Immunizations    Influenza Covered once per flu season  Please get every year 09/29/2023  No recommendations at this time    Pneumococcal Each vaccine ([id number] & [id number]) covered once after 65 Prevnar 13: 10/24/2020    [id number]: -     Pneumococcal Vaccination(2 of 2 - PPSV23 or PCV20) due on 10/24/2021    Hepatitis B One screening covered for patients with certain risk factors   -  No recommendations at this time    Tetanus Toxoid Not covered by Medicare Part B unless medically  necessary (cut with metal); may be covered with your pharmacy prescription benefits -    Tetanus, Diptheria and Pertusis TD and TDaP Not covered by Medicare Part B -  No recommendations at this time    Zoster Not covered by Medicare Part B; may be covered with your pharmacy  prescription benefits 09/23/2017  No recommendations at this time

## 2024-04-10 NOTE — PROGRESS NOTES
Subjective:   Emil Askew is a 69 year old adult who presents for a MA (Medicare Advantage) Supervisit (Once per calendar year) and scheduled follow up of multiple significant but stable problems.     Patient is here requesting Medicare advantage AHA visit and follow-up on chronic medical issues as listed below.  Last seen in the office February 3 of last year for general physical exam.  At that time patient was retired and was having less stress.  Patient was undergoing a gender transition process at that time for underlying gender dysphoria.  Patient was born a male.  Undergoing transition process using hormone treatments.  Patient had been treated with spironolactone and estradiol previously.  Patient also with history of elevated PSA.  PSA last checked February 12 of last year and it was 8.13.  Patient was advised to follow-up with urology for the possibility of prostate cancer.  Patient was going to look into urologist that was more informed and regarding the gender transition process.  Current medications reviewed.  Health maintenance and vaccination status reviewed.  Advanced directives reviewed.    Blood work done at [facility] on February 22 showed a PSA of 2.96, testosterone of under 10, and estradiol of 264.  Last normal testosterone was January 20, 2023.    Most recent note from Dr. Vickie Marie at [facility] reviewed.  She is the doctor has been guiding the gender transition.  They are monitoring hormone levels and adjusting medication as needed.  Last note also mentioned severe episode of recurrent major depressive disorder possibly related to the patient's family not fully excepting the transition process.     Pt stopped the spironolactone. Pt is taking estrodiol and progesterone. On no testosterone blocker. No surgery. Pt is seeing Dr Stephanie Garcia with [facility] for Urology. Failed the MRI trial even with valium. Had a biopsy last year that was negative. Due for another  PSA in 90 days.     Pt fell on new years dawn; large bruise on lower back; not evaluated; pain has been getting better. Also cut right ring finger; no stitches; back to normal now. Pt is fully retired. Goes to the fitness center 60 minutes 4-7 days a week; no longer running due to knees; walks on the treadmill; uses rowing machine, recumbent bike, elliptical; not much weights. Pt watches his grandchildren. Diet is the same. Weight is up 3 pounds since last year. She is upset about inability to lose weight. No tobacco. EtOH is not much.       History/Other:   Fall Risk Assessment:   She has been screened for Falls and is low risk.      Cognitive Assessment:   She had a completely normal cognitive assessment - see flowsheet entries     Functional Ability/Status:   Emil Askew has a completely normal functional assessment. See flowsheet for details.        Depression Screening (PHQ-2/PHQ-9): PHQ-2 SCORE: 2  , done 4/10/2024   Feeling down, depressed, or hopeless: 2    If you checked off any problems, how difficult have these problems made it for you to do your work, take care of things at home, or get along with other people?: Not difficult at all    Last [place] Suicide Screening on 4/10/2024 was No Risk.          Advanced Directives:   She does NOT have a Living Will. [Do you have a living will?: No]  She does NOT have a Power of  for Health Care. [Do you have a healthcare power of ?: No]  Discussed Advance Care Planning with patient (and family/surrogate if present). Standard forms made available to patient in After Visit Summary.      Patient Active Problem List   Diagnosis    SVT (supraventricular tachycardia) (HCC)    Hypercholesteremia    Gender dysphoria     Allergies:  She has No Known Allergies.    Current Medications:  Outpatient Medications Marked as Taking for the 4/10/24 encounter (Office Visit) with Eduardo Caban MD   Medication Sig    tamsulosin 0.4 MG Oral Cap Take 1  capsule (0.4 mg total) by mouth daily.    Red Yeast Rice Does not apply Powder Take 1 tablet by mouth daily.    BD HYPODERMIC NEEDLE 22G X 1\" Does not apply Misc USE TO INJECT INTO THE MUSCLE ONCE A WEEK    BD DISP NEEDLES 22G X 1-1/2\" Does not apply Misc     Needles & Syringes (EASY TOUCH SYRINGE BARREL 1ML) Does not apply Misc 1 Syringe As Directed.    Ethyl Alcohol, Skin Cleanser, 62 % External Misc Apply 1 Units topically once a week.    progesterone 100 MG Oral Cap     Estradiol Valerate 20 MG/ML Intramuscular Oil     Sertraline HCl 50 MG Oral Tab Take 2 tablets (100 mg total) by mouth once daily.    Cholecalciferol (VITAMIN D-3 OR) Take 1 capsule by mouth daily.    Ascorbic Acid (VITAMIN C OR) Take 1 tablet by mouth daily.    Omega-3 Fatty Acids (FISH OIL OR) Take 1 capsule by mouth daily.       Medical History:  She  has a past medical history of Atypical chest pain (07/10/2008), History of cardiac catheterization (12/17/2001), History of colonic polyps, and SVT (supraventricular tachycardia) (HCC) (12,2001).  Surgical History:  She  has a past surgical history that includes colonoscopy; appendectomy (1975); nm gb hepatobiliary scan  (cpt=78226) (11/16/2011); electrocardiogram, complete (11/6/2011); cholecystectomy; and colonoscopy (N/A, 5/18/2020).   Family History:  Her family history includes Cancer in her father and paternal grandfather; Cholelithiasis in her mother and sister.  Social History:  She  reports that she has never smoked. She has never used smokeless tobacco. She reports current alcohol use of about 1.0 standard drink of alcohol per week. She reports that she does not use drugs.    Tobacco:  She has never smoked tobacco.    CAGE Alcohol Screen:   CAGE screening score of 0 on 4/3/2024, showing low risk of alcohol abuse.      Patient Care Team:  Eduardo Caban MD as PCP - General (Internal Medicine)    Review of Systems       Objective:   Physical Exam  Constitutional:       Appearance:  Normal appearance. She is well-developed.   HENT:      Nose: Nose normal.      Mouth/Throat:      Pharynx: No oropharyngeal exudate or posterior oropharyngeal erythema.   Eyes:      Conjunctiva/sclera: Conjunctivae normal.   Neck:      Vascular: No carotid bruit.   Cardiovascular:      Rate and Rhythm: Normal rate and regular rhythm.      Pulses: Normal pulses.      Heart sounds: Normal heart sounds. No murmur heard.  Pulmonary:      Effort: Pulmonary effort is normal.      Breath sounds: Normal breath sounds. No wheezing or rales.   Chest:   Breasts:     Right: No inverted nipple, mass, nipple discharge, skin change or tenderness.      Left: No inverted nipple, mass, nipple discharge, skin change or tenderness.   Abdominal:      General: Bowel sounds are normal.      Palpations: Abdomen is soft. There is no mass.      Tenderness: There is no abdominal tenderness.   Genitourinary:     Penis: Normal and circumcised.       Testes:         Right: Mass, tenderness or swelling not present.         Left: Mass, tenderness or swelling not present.      Comments: Testes- decreased in size  Musculoskeletal:      Right lower leg: No edema.      Left lower leg: No edema.   Lymphadenopathy:      Cervical: No cervical adenopathy.   Skin:     General: Skin is warm and dry.      Findings: No rash.   Neurological:      General: No focal deficit present.      Mental Status: She is alert.   Psychiatric:         Mood and Affect: Mood normal.         Behavior: Behavior normal.         Thought Content: Thought content normal.          /62 (BP Location: Left arm, Patient Position: Sitting, Cuff Size: large)   Pulse 82   Temp 98 °F (36.7 °C) (Other)   Resp 18   Ht 5' 8.5\" (1.74 m)   Wt 201 lb (91.2 kg)   BMI 30.12 kg/m²  Estimated body mass index is 30.12 kg/m² as calculated from the following:    Height as of this encounter: 5' 8.5\" (1.74 m).    Weight as of this encounter: 201 lb (91.2 kg).    Medicare Hearing Assessment:    Hearing Screening    Screening Method: Questionnaire  I have a problem hearing over the telephone: No I have trouble following the conversations when two or more people are talking at the same time: No   I have trouble understanding things on the TV: No I have to strain to understand conversations: No   I have to worry about missing the telephone ring or doorbell: No I have trouble hearing conversations in a noisy background such as a crowded room or restaurant: No   I get confused about where sounds come from: No I misunderstand some words in a sentence and need to ask people to repeat themselves: No   I especially have trouble understanding the speech of women and children: No I have trouble understanding the speaker in a large room such as at a meeting or place of Church: No   Many people I talk to seem to mumble (or don't speak clearly): No People get annoyed because I misunderstand what they say: No   I misunderstand what others are saying and make inappropriate responses: No I avoid social activities because I cannot hear well and fear I will reply improperly: No   Family members and friends have told me they think I may have hearing loss: No             Visual Acuity:   Right Eye Visual Acuity: Corrected Right Eye Chart Acuity: 20/25   Left Eye Visual Acuity: Corrected Left Eye Chart Acuity: 20/25   Both Eyes Visual Acuity: Corrected Both Eyes Chart Acuity: 20/25   Able To Tolerate Visual Acuity: Yes        Assessment & Plan:   Emil Askew is a 69 year old adult who presents for a Medicare Assessment.     1. Encounter for annual health examination  Exam is relatively unremarkable.  Breast exam is what to be expected given hormonal changes.  Testicles are atrophied.  Exam otherwise unremarkable.  Active issues as below.  Health maintenance issues reviewed.  Encouraged to work on advance directives.    2. Gender dysphoria in adult  Patient continues along with the transition process.  Currently on  estrogen and progesterone.  Testosterone levels are very low.  Has not had any surgery to this point.  Continue close follow-up with gender transition doctor.    3. Hyperlipidemia, unspecified hyperlipidemia type  On no medications now.  Check blood work.  Work on diet and exercise.  Consider medications pending results as well as 10-year risk of cardiovascular event.  - CBC With Differential With Platelet; Future  - Comp Metabolic Panel (14); Future  - Lipid Panel; Future  - TSH W Reflex To Free T4; Future  - Vitamin B12; Future    4. Anxiety and depression  Continue with sertraline.  Continue follow-up with behavioral health professionals.    5.  Elevated PSA  Continue close follow-up with urology.    The patient indicates understanding of these issues and agrees to the plan.  Reinforced healthy diet, lifestyle, and exercise.      No follow-ups on file.     Eduardo Caban MD, 4/10/2024     Supplementary Documentation:   General Health:  In the past six months, have you lost more than 10 pounds without trying?: 2 - No  Has your appetite been poor?: No  Type of Diet: Balanced  How does the patient maintain a good energy level?: Appropriate Exercise  How would you describe your daily physical activity?: Moderate  How would you describe your current health state?: Good  How do you maintain positive mental well-being?: Social Interaction;Puzzles;Visiting Friends;Visiting Family  On a scale of 0 to 10, with 0 being no pain and 10 being severe pain, what is your pain level?: 1 - (Mild)  In the past six months, have you experienced urine leakage?: 0-No  At any time do you feel concerned for the safety/well-being of yourself and/or your children, in your home or elsewhere?: No  Have you had any immunizations at another office such as Influenza, Hepatitis B, Tetanus, or Pneumococcal?: No        Emil Askew's SCREENING SCHEDULE   Tests on this list are recommended by your physician but may not be covered, or  covered at this frequency, by your insurer.   Please check with your insurance carrier before scheduling to verify coverage.   PREVENTATIVE SERVICES FREQUENCY &  COVERAGE DETAILS LAST COMPLETION DATE   Diabetes Screening    Fasting Blood Sugar / Glucose    One screening every 12 months if never tested or if previously tested but not diagnosed with pre-diabetes   One screening every 6 months if diagnosed with pre-diabetes Lab Results   Component Value Date     (H) 02/12/2023        Cardiovascular Disease Screening    Lipid Panel  Cholesterol  Lipoprotein (HDL)  Triglycerides Covered every 5 years for all Medicare beneficiaries without apparent signs or symptoms of cardiovascular disease Lab Results   Component Value Date    CHOLEST 189 02/12/2023    HDL 53 02/12/2023     (H) 02/12/2023    TRIG 57 02/12/2023         Electrocardiogram (EKG)   Covered if needed at Welcome to Medicare, and non-screening if indicated for medical reasons 07/09/2018      Ultrasound Screening for Abdominal Aortic Aneurysm (AAA) Covered once in a lifetime for one of the following risk factors    Men who are 65-75 years old and have ever smoked    Anyone with a family history -     Colorectal Cancer Screening  Covered for ages 50-85; only need ONE of the following:    Colonoscopy   Covered every 10 years    Covered every 2 years if patient is at high risk or previous colonoscopy was abnormal 05/18/2020    Health Maintenance   Topic Date Due    Colorectal Cancer Screening  05/18/2025       Flexible Sigmoidoscopy   Covered every 4 years -    Fecal Occult Blood Test Covered annually -   Prostate Cancer Screening    Prostate-Specific Antigen (PSA) Annually Lab Results   Component Value Date    PSA 7.61 (H) 12/04/2020     Health Maintenance   Topic Date Due    PSA  02/12/2025      Immunizations    Influenza Covered once per flu season  Please get every year 09/29/2023  No recommendations at this time    Pneumococcal Each vaccine  ([id number] & [id number]) covered once after 65 Prevnar 13: 10/24/2020    [id number]: -     Pneumococcal Vaccination(2 of 2 - PPSV23 or PCV20) due on 10/24/2021    Hepatitis B One screening covered for patients with certain risk factors   -  No recommendations at this time    Tetanus Toxoid Not covered by Medicare Part B unless medically necessary (cut with metal); may be covered with your pharmacy prescription benefits -    Tetanus, Diptheria and Pertusis TD and TDaP Not covered by Medicare Part B -  No recommendations at this time    Zoster Not covered by Medicare Part B; may be covered with your pharmacy  prescription benefits 09/23/2017  No recommendations at this time

## 2024-04-19 ENCOUNTER — TELEPHONE (OUTPATIENT)
Dept: INTERNAL MEDICINE CLINIC | Facility: CLINIC | Age: 69
End: 2024-04-19

## 2024-04-19 DIAGNOSIS — N39.0 URINARY TRACT INFECTION WITHOUT HEMATURIA, SITE UNSPECIFIED: Primary | ICD-10-CM

## 2024-04-19 NOTE — TELEPHONE ENCOUNTER
Patient states Dr Stephanie Carey's office faxed over urine test results this morning that shows a staph infection.  They sent script for Linezolid 600 mg BID x 14 days to [facility] in [place] (patient [place] nearby).  Pharmacist said it flagged reaction with sertraline which he takes for depression.  Patient asking what to do, he purchased the Linezolid.  Urology is deferring to PCP office on whether he can take.    This RN called [facility], spoke with Stephanie who could not find any faxed labs for this patient.    [facility] office is closed for the afternoon.    Routed to Dr Caban, who is not in the office and to on call for IM, Dr Flores.  Please advise?  This RN notified patient to inform.  He will call [facility] to see if answering service can leave message for them to refax urine culture.    From UP TO DATE:  Selective Serotonin Reuptake Inhibitors: Linezolid may enhance the serotonergic effect of Selective Serotonin Reuptake Inhibitors. This could result in serotonin syndrome. Risk X: Avoid combination

## 2024-04-19 NOTE — TELEPHONE ENCOUNTER
Patient called and wants to let staff know his urologist faxed over his urine test results, please notify patient when received and also want to know if he will be taking medication.

## 2024-04-20 NOTE — TELEPHONE ENCOUNTER
I am not able to see any results of urine culture or the sensitivity or what type of staph infection the patient has.   So they need to contact their urology office for us to see the sensitivity of the bacteria and order appropriate antibiotics.     If the patient's symptoms are worsening they can go to the nearest ER.     I don't advise taking the medications together at this moment. Hold off.

## 2024-04-22 NOTE — TELEPHONE ENCOUNTER
Clinical Staff Jay Em location:   Please see if any incoming reports came in for urine culture.     If you don't see it, call [facility], Dr Garcia's office to obtain report.

## 2024-04-24 NOTE — TELEPHONE ENCOUNTER
I spoke with the patient by phone.  No symptoms of urinary tract infection.  Not feeling ill.  No fever.  I also reviewed the literature regarding linezolid and sertraline.  There is some ambivalence and that it is considered a level ask interaction in 1 place and another play states more recent data shows that it can be taken together.  Given lack of symptoms, I see no urgency.  I have placed an order for repeat urinalysis to see whether or not the infection is still there or not.  Patient will get that done upon return from [place].

## 2024-05-04 ENCOUNTER — LAB ENCOUNTER (OUTPATIENT)
Dept: LAB | Facility: HOSPITAL | Age: 69
End: 2024-05-04
Attending: INTERNAL MEDICINE
Payer: MEDICARE

## 2024-05-04 DIAGNOSIS — E78.5 HYPERLIPIDEMIA, UNSPECIFIED HYPERLIPIDEMIA TYPE: ICD-10-CM

## 2024-05-04 LAB
ALBUMIN SERPL-MCNC: 4.3 G/DL (ref 3.2–4.8)
ALBUMIN/GLOB SERPL: 1.9 {RATIO} (ref 1–2)
ALP LIVER SERPL-CCNC: 40 U/L
ALT SERPL-CCNC: 18 U/L
ANION GAP SERPL CALC-SCNC: 4 MMOL/L (ref 0–18)
AST SERPL-CCNC: 23 U/L (ref ?–34)
BASOPHILS # BLD AUTO: 0.05 X10(3) UL (ref 0–0.2)
BASOPHILS NFR BLD AUTO: 0.9 %
BILIRUB SERPL-MCNC: 0.9 MG/DL (ref 0.2–1.1)
BILIRUB UR QL: NEGATIVE
BUN BLD-MCNC: 14 MG/DL (ref 9–23)
BUN/CREAT SERPL: 13.9 (ref 10–20)
CALCIUM BLD-MCNC: 9.4 MG/DL (ref 8.7–10.4)
CHLORIDE SERPL-SCNC: 110 MMOL/L (ref 98–112)
CHOLEST SERPL-MCNC: 221 MG/DL (ref ?–200)
CLARITY UR: CLEAR
CO2 SERPL-SCNC: 26 MMOL/L (ref 21–32)
COLOR UR: YELLOW
CREAT BLD-MCNC: 1.01 MG/DL
DEPRECATED RDW RBC AUTO: 41.5 FL (ref 35.1–46.3)
EGFRCR SERPLBLD CKD-EPI 2021: 81 ML/MIN/1.73M2 (ref 60–?)
EOSINOPHIL # BLD AUTO: 0.15 X10(3) UL (ref 0–0.7)
EOSINOPHIL NFR BLD AUTO: 2.7 %
ERYTHROCYTE [DISTWIDTH] IN BLOOD BY AUTOMATED COUNT: 12.9 % (ref 11–15)
FASTING PATIENT LIPID ANSWER: YES
FASTING STATUS PATIENT QL REPORTED: YES
GLOBULIN PLAS-MCNC: 2.3 G/DL (ref 2–3.5)
GLUCOSE BLD-MCNC: 99 MG/DL (ref 70–99)
GLUCOSE UR-MCNC: NORMAL MG/DL
HCT VFR BLD AUTO: 41.3 %
HDLC SERPL-MCNC: 50 MG/DL (ref 40–59)
HGB BLD-MCNC: 14.2 G/DL
HGB UR QL STRIP.AUTO: NEGATIVE
IMM GRANULOCYTES # BLD AUTO: 0.02 X10(3) UL (ref 0–1)
IMM GRANULOCYTES NFR BLD: 0.4 %
KETONES UR-MCNC: NEGATIVE MG/DL
LDLC SERPL CALC-MCNC: 156 MG/DL (ref ?–100)
LEUKOCYTE ESTERASE UR QL STRIP.AUTO: NEGATIVE
LYMPHOCYTES # BLD AUTO: 1.4 X10(3) UL (ref 1–4)
LYMPHOCYTES NFR BLD AUTO: 25.5 %
MCH RBC QN AUTO: 30.2 PG (ref 26–34)
MCHC RBC AUTO-ENTMCNC: 34.4 G/DL (ref 31–37)
MCV RBC AUTO: 87.9 FL
MONOCYTES # BLD AUTO: 0.45 X10(3) UL (ref 0.1–1)
MONOCYTES NFR BLD AUTO: 8.2 %
NEUTROPHILS # BLD AUTO: 3.42 X10 (3) UL (ref 1.5–7.7)
NEUTROPHILS # BLD AUTO: 3.42 X10(3) UL (ref 1.5–7.7)
NEUTROPHILS NFR BLD AUTO: 62.3 %
NITRITE UR QL STRIP.AUTO: NEGATIVE
NONHDLC SERPL-MCNC: 171 MG/DL (ref ?–130)
OSMOLALITY SERPL CALC.SUM OF ELEC: 291 MOSM/KG (ref 275–295)
PH UR: 6 [PH] (ref 5–8)
PLATELET # BLD AUTO: 223 10(3)UL (ref 150–450)
POTASSIUM SERPL-SCNC: 4.1 MMOL/L (ref 3.5–5.1)
PROT SERPL-MCNC: 6.6 G/DL (ref 5.7–8.2)
PROT UR-MCNC: NEGATIVE MG/DL
RBC # BLD AUTO: 4.7 X10(6)UL
SODIUM SERPL-SCNC: 140 MMOL/L (ref 136–145)
SP GR UR STRIP: 1.02 (ref 1–1.03)
TRIGL SERPL-MCNC: 85 MG/DL (ref 30–149)
TSI SER-ACNC: 1.76 MIU/ML (ref 0.55–4.78)
UROBILINOGEN UR STRIP-ACNC: NORMAL
VIT B12 SERPL-MCNC: 511 PG/ML (ref 211–911)
VLDLC SERPL CALC-MCNC: 16 MG/DL (ref 0–30)
WBC # BLD AUTO: 5.5 X10(3) UL (ref 4–11)

## 2024-05-04 PROCEDURE — 36415 COLL VENOUS BLD VENIPUNCTURE: CPT

## 2024-05-04 PROCEDURE — 84443 ASSAY THYROID STIM HORMONE: CPT

## 2024-05-04 PROCEDURE — 81003 URINALYSIS AUTO W/O SCOPE: CPT | Performed by: INTERNAL MEDICINE

## 2024-05-04 PROCEDURE — 82607 VITAMIN B-12: CPT

## 2024-05-04 PROCEDURE — 85025 COMPLETE CBC W/AUTO DIFF WBC: CPT

## 2024-05-04 PROCEDURE — 80053 COMPREHEN METABOLIC PANEL: CPT

## 2024-05-04 PROCEDURE — 80061 LIPID PANEL: CPT

## 2024-08-27 ENCOUNTER — OFFICE VISIT (OUTPATIENT)
Dept: INTERNAL MEDICINE CLINIC | Facility: CLINIC | Age: 69
End: 2024-08-27
Payer: MEDICARE

## 2024-08-27 VITALS
HEIGHT: 68.5 IN | WEIGHT: 201 LBS | DIASTOLIC BLOOD PRESSURE: 74 MMHG | SYSTOLIC BLOOD PRESSURE: 116 MMHG | BODY MASS INDEX: 30.11 KG/M2 | HEART RATE: 82 BPM

## 2024-08-27 DIAGNOSIS — M54.2 NECK PAIN: ICD-10-CM

## 2024-08-27 DIAGNOSIS — M25.511 ACUTE PAIN OF RIGHT SHOULDER: Primary | ICD-10-CM

## 2024-08-27 PROCEDURE — 1159F MED LIST DOCD IN RCRD: CPT | Performed by: NURSE PRACTITIONER

## 2024-08-27 PROCEDURE — 3078F DIAST BP <80 MM HG: CPT | Performed by: NURSE PRACTITIONER

## 2024-08-27 PROCEDURE — 3008F BODY MASS INDEX DOCD: CPT | Performed by: NURSE PRACTITIONER

## 2024-08-27 PROCEDURE — 1125F AMNT PAIN NOTED PAIN PRSNT: CPT | Performed by: NURSE PRACTITIONER

## 2024-08-27 PROCEDURE — 3074F SYST BP LT 130 MM HG: CPT | Performed by: NURSE PRACTITIONER

## 2024-08-27 PROCEDURE — 99213 OFFICE O/P EST LOW 20 MIN: CPT | Performed by: NURSE PRACTITIONER

## 2024-08-27 RX ORDER — METHYLPREDNISOLONE 4 MG
TABLET, DOSE PACK ORAL
Qty: 1 EACH | Refills: 0 | Status: SHIPPED | OUTPATIENT
Start: 2024-08-27

## 2024-08-27 NOTE — PATIENT INSTRUCTIONS
Cervicalgia  Likely muscular strain.  No symptoms or signs of cervical radiculopathy.     Ice areas of discomfort 15 to 20  minutes four times per day.

## 2024-08-27 NOTE — PROGRESS NOTES
HPI:    Patient ID: Emil Askew is a 69 year old adult.  Shoulder Pain (Pt c/o right shoulder pain that radiates to neck and arms.)  HPI Neck and Right Arm Pain.  69 year old female who is a patient of Dr. Caban  I am meeting this patient for the first time.    Neck pain-Right arm pain - Pain Radiates down her arm.  Concerned about having a pinched nerve        Immunization History   Administered Date(s) Administered    Covid-19 Vaccine Moderna 100 mcg/0.5 ml 03/06/2021, 04/03/2021, 10/31/2021    FLU VAC High Dose 65 YRS & Older PRSV Free (31926) 09/29/2023    FLUZONE 6 months and older PFS 0.5 ml (73433) 10/03/2019, 10/24/2020, 10/04/2021    Flucelvax Influenza vaccine, trivalent (ccIIV3), 0.5mL IM 10/16/2018, 10/11/2022    Fluvirin, 3 Years & >, Im 10/08/2015, 10/06/2016    Influenza 10/15/2013, 10/15/2013, 09/25/2017, 10/16/2018, 10/16/2018, 10/11/2022    Influenza Vaccine, Preserv Free 10/15/2009    Influenza(Afluria)0.5ml QIV PFS 10/24/2020    Pneumococcal (Prevnar 13) 10/24/2020    TDAP 12/21/2017    Zoster Vaccine Live (Zostavax) 09/23/2017    Zoster Vaccine Recombinant Adjuvanted (Shingrix) 01/03/2020, 03/13/2020       Past Medical History:    Atypical chest pain    Per NG : Normal stress echo    History of cardiac catheterization    Per NG: Mild non-obstructive 3 vessel CAD    History of colonic polyps    Per NG: Colonic polyps, dysplasia;  colonoscopy with polypectomy; recheck 7/11    SVT (supraventricular tachycardia) (HCC)    Per NG; Shot in ER ( likely adenosine)      Past Surgical History:   Procedure Laterality Date    Appendectomy  1975    Apppendictis    Cholecystectomy      Colonoscopy      Colonoscopy N/A 5/18/2020    Procedure: COLONOSCOPY;  Surgeon: Maverick Taylor MD;  Location: [facility] ENDOSCOPY    Electrocardiogram, complete  11/6/2011    scanned to media tab    Nm hepatobiliary scan  (dzl=62877)  11/16/2011    Per NG: laparoscopic cholecystectomy      Social History      Socioeconomic History    Marital status:    Tobacco Use    Smoking status: Never    Smokeless tobacco: Never   Vaping Use    Vaping status: Never Used   Substance and Sexual Activity    Alcohol use: Yes     Alcohol/week: 1.0 standard drink of alcohol     Types: 1 Cans of beer per week    Drug use: No    Sexual activity: Not Currently     Partners: Female   Other Topics Concern    Caffeine Concern Yes     Comment: Per NG: coffee 2 cups          Review of Systems   Constitutional:  Negative for chills, fatigue and fever.   HENT:  Negative for ear pain, hearing loss, sinus pain, sore throat and trouble swallowing.    Eyes:  Negative for pain and visual disturbance.   Respiratory:  Negative for cough, chest tightness and shortness of breath.    Cardiovascular:  Negative for chest pain, palpitations and leg swelling.   Gastrointestinal:  Negative for abdominal pain, constipation, diarrhea, nausea and vomiting.   Endocrine: Negative for cold intolerance and heat intolerance.   Genitourinary:  Negative for dysuria and hematuria.   Musculoskeletal:  Positive for neck pain. Negative for back pain and joint swelling.   Skin:  Negative for rash.   Allergic/Immunologic: Negative for environmental allergies.   Neurological:  Negative for weakness, numbness and headaches.   Hematological:  Does not bruise/bleed easily.   Psychiatric/Behavioral:  Negative for dysphoric mood and sleep disturbance. The patient is not nervous/anxious.               Current Outpatient Medications   Medication Sig Dispense Refill    methylPREDNISolone (MEDROL) 4 MG Oral Tablet Therapy Pack As directed. 1 each 0    tamsulosin 0.4 MG Oral Cap Take 1 capsule (0.4 mg total) by mouth daily.      sertraline 100 MG Oral Tab Take 1.5 tablets (150 mg total) by mouth daily.      Red Yeast Rice Does not apply Powder Take 1 tablet by mouth daily.      BD HYPODERMIC NEEDLE 22G X 1\" Does not apply Misc USE TO INJECT INTO THE MUSCLE ONCE A WEEK      BD  DISP NEEDLES 22G X 1-1/2\" Does not apply Misc       Needles & Syringes (EASY TOUCH SYRINGE BARREL 1ML) Does not apply Misc 1 Syringe As Directed.      progesterone 100 MG Oral Cap       Estradiol Valerate 20 MG/ML Intramuscular Oil       Cholecalciferol (VITAMIN D-3 OR) Take 1 capsule by mouth daily.      Ascorbic Acid (VITAMIN C OR) Take 1 tablet by mouth daily.      Omega-3 Fatty Acids (FISH OIL OR) Take 1 capsule by mouth daily.       Allergies:No Known Allergies   PHYSICAL EXAM:   Physical Exam  Constitutional:       Appearance: Normal appearance. She is well-developed.   HENT:      Head: Normocephalic.      Right Ear: Tympanic membrane normal.      Left Ear: Tympanic membrane normal.      Nose: Nose normal.      Mouth/Throat:      Mouth: Mucous membranes are moist.      Pharynx: No oropharyngeal exudate or posterior oropharyngeal erythema.   Eyes:      General:         Right eye: No discharge.         Left eye: No discharge.      Pupils: Pupils are equal, round, and reactive to light.   Cardiovascular:      Rate and Rhythm: Normal rate and regular rhythm.      Heart sounds: Normal heart sounds. No murmur heard.     No friction rub. No gallop.   Pulmonary:      Effort: Pulmonary effort is normal. No respiratory distress.      Breath sounds: Normal breath sounds. No wheezing, rhonchi or rales.   Abdominal:      General: Bowel sounds are normal. There is no distension.      Palpations: Abdomen is soft. There is no mass.      Tenderness: There is no abdominal tenderness. There is no right CVA tenderness, left CVA tenderness or guarding.   Musculoskeletal:         General: Tenderness (Patient with pain in her acromiclavicular joint) present. No swelling, deformity or signs of injury. Normal range of motion.      Cervical back: Normal range of motion and neck supple. No tenderness.      Right lower leg: No edema.      Left lower leg: No edema.   Lymphadenopathy:      Cervical: No cervical adenopathy.   Skin:      General: Skin is warm and dry.      Findings: No rash.   Neurological:      Mental Status: She is alert and oriented to person, place, and time.      Coordination: Coordination normal.      Gait: Gait normal.   Psychiatric:         Mood and Affect: Mood normal.         Behavior: Behavior normal.         Thought Content: Thought content normal.         Judgment: Judgment normal.       /74 (BP Location: Left arm, Patient Position: Sitting, Cuff Size: adult)   Pulse 82   Ht 5' 8.5\" (1.74 m)   Wt 201 lb (91.2 kg)   BMI 30.12 kg/m²   Wt Readings from Last 2 Encounters:   08/27/24 201 lb (91.2 kg)   04/10/24 201 lb (91.2 kg)     Body mass index is 30.12 kg/m².(2)  Lab Results   Component Value Date    WBC 5.5 05/04/2024    RBC 4.70 05/04/2024    HGB 14.2 05/04/2024    HCT 41.3 05/04/2024    MCV 87.9 05/04/2024    MCH 30.2 05/04/2024    MCHC 34.4 05/04/2024    RDW 12.9 05/04/2024    .0 05/04/2024    MPV 7.6 12/22/2018      Lab Results   Component Value Date    GLU 99 05/04/2024    BUN 14 05/04/2024    BUNCREA 13.9 05/04/2024    CREATSERUM 1.01 05/04/2024    ANIONGAP 4 05/04/2024    GFRNAA 81 05/28/2022    GFRAA 94 05/28/2022    CA 9.4 05/04/2024    OSMOCALC 291 05/04/2024    ALKPHO 40 05/04/2024    AST 23 05/04/2024    ALT 18 05/04/2024    ALKPHOS 44 11/30/2015    BILT 0.9 05/04/2024    TP 6.6 05/04/2024    ALB 4.3 05/04/2024    GLOBULIN 2.3 05/04/2024    AGRATIO 1.7 11/30/2015     05/04/2024    K 4.1 05/04/2024     05/04/2024    CO2 26.0 05/04/2024      Lab Results   Component Value Date     02/05/2022    A1C 5.4 02/05/2022      Lab Results   Component Value Date    CHOLEST 221 (H) 05/04/2024    TRIG 85 05/04/2024    HDL 50 05/04/2024     (H) 05/04/2024    VLDL 16 05/04/2024    NONHDLC 171 (H) 05/04/2024    CALCNONHDL 168 (H) 11/30/2015      Lab Results   Component Value Date    TSH 1.762 05/04/2024                ASSESSMENT/PLAN:     Problem List Items Addressed This Visit        Neck pain     Instructions   from Monique Jarquin  Cervicalgia  Likely muscular strain.  No symptoms or signs of cervical radiculopathy.      Ice areas of discomfort 15 to 20  minutes four times per day.     Voltaren gel/ or cream  in right shoulder  joint.- 3 times per day                             Relevant Medications    methylPREDNISolone (MEDROL) 4 MG Oral Tablet Therapy Pack     Other Visit Diagnoses       Acute pain of right shoulder    -  Primary    Relevant Medications    methylPREDNISolone (MEDROL) 4 MG Oral Tablet Therapy Pack    Other Relevant Orders    RHEUMATOLOGY - INTERNAL               No orders of the defined types were placed in this encounter.      Meds This Visit:  Requested Prescriptions     Signed Prescriptions Disp Refills    methylPREDNISolone (MEDROL) 4 MG Oral Tablet Therapy Pack 1 each 0     Sig: As directed.     If above does not work follow up with Rheumatology  Imaging & Referrals:  RHEUMATOLOGY - INTERNAL         VALARIE Henry

## 2024-08-27 NOTE — ASSESSMENT & PLAN NOTE
Instructions   from Monique Jarquin  Cervicalgia  Likely muscular strain.  No symptoms or signs of cervical radiculopathy.      Ice areas of discomfort 15 to 20  minutes four times per day.     Voltaren gel/ or cream  in right shoulder  joint.- 3 times per day

## 2024-09-16 ENCOUNTER — TELEPHONE (OUTPATIENT)
Dept: INTERNAL MEDICINE CLINIC | Facility: CLINIC | Age: 69
End: 2024-09-16

## 2024-09-16 NOTE — TELEPHONE ENCOUNTER
Patient due for her annual mammogram and Dexa Scan. Patient last seen in office on 8/27/24, no future appointments scheduled.Care Gap letter generated and sent to patient VIA [employer].

## 2024-09-19 ENCOUNTER — TELEPHONE (OUTPATIENT)
Dept: CASE MANAGEMENT | Age: 69
End: 2024-09-19

## 2024-09-19 DIAGNOSIS — M25.511 ACUTE PAIN OF RIGHT SHOULDER: Primary | ICD-10-CM

## 2024-09-19 NOTE — TELEPHONE ENCOUNTER
Dr. Caban,     Patient called requesting referral to Dr. Westbrook.    Pended referral please review diagnosis and sign off if you agree.    Thank you.  Gillian Richards  [facility]

## 2024-09-20 ENCOUNTER — CLINICAL ABSTRACT (OUTPATIENT)
Dept: FAMILY MEDICINE | Age: 69
End: 2024-09-20

## 2024-11-26 NOTE — PROGRESS NOTES
RHEUMATOLOGY CLINIC- NEW PATIENT    Emil Askew is a 69 year old adult.    ASSESSMENT/PLAN:       ICD-10-CM    1. Acute pain of right shoulder  M25.511 XR SHOULDER, COMPLETE (MIN 2 VIEWS), RIGHT (CPT=73030)     Physical Therapy Referral - [facility]     triamcinolone acetonide (Kenalog-40) 40 MG/ML injection 40 mg     DRAIN/INJECT LARGE JOINT/BURSA      2. Osteoarthritis of multiple joints, unspecified osteoarthritis type  M15.9 Physical Therapy Referral - [facility]     triamcinolone acetonide (Kenalog-40) 40 MG/ML injection 40 mg            DISCUSSION: Patient presents as a new outpatient referral for evaluation of right shoulder pain, previously responsive to right shoulder CSI about 20 years ago with recurrence starting about 1 year ago.  No features at this time consistent with an underlying inflammatory arthropathy including no synovitis on current exam.  Rather, I suspect a more degenerative, mechanical etiology.  Will check shoulder x-ray for independent review.  Discussed with patient conservative management and she is agreeable to physical therapy.  After risk and benefits discussed, right shoulder CSI also performed in office today.    PLAN:  - Right shoulder CSI performed in office today.  - For pain relief, patient can take Tylenol-arthritis strength at 2 tablets every 8 hours as needed. May also apply topical Voltaren (diclofenac gel) to their affected areas of pain up to 4 times daily  -Referral to physical therapy   - Consult/evaluation communicated with referring physician/provider.    Pt will f/u as needed    PROCEDURE: Right Glenohumoral Steroid Injection Pocedure Note  INDICATION: Right Shoulder Pain    Procedure:After consent and discussing pro and cons as well as alternatives to treatment. Prior to the injection the area was cleansed with Povidone-Iodine swabs and alcohol wipes. The right glenohumoral joint was then injected with Xylocaine 1% x 1 mL first before a  mixture of Xylocaine 1% x 2 mL plus Triamcinolone 40 mg, followed by Xylocaine 1% x1 mL . Patient tolerated procedure well. Post procedure instructions were given as well as instructions for followup. Patient to call with any questions especially the fever, chills worsening pain or erythema. Answered all their questions.     Lake BlankenshipzariaDO  11/26/2024   4:29 PM    HPI:     11/26/2024 Initial Consult: I had the pleasure of seeing Emil Askew on 11/26/2024 as a new outpatient consultation for shoulder pain. The patient was originally referred by VALARIE Little.     69 year old transgender female w/ PMH SVT, HLD who presents to clinic today.  Patient states symptoms started about 25 years ago with right shoulder pain.  He had a CSI around that time with resolution until about a year ago.  Was recently prescribed a Medrol Dosepak by his PCP that was somewhat helpful but had a recurrence after completion.  Rates pain anywhere between a 2-8/10 in pain with flares occurring a couple times a week.  A few minutes of warm shower in the morning helps with the pain.  ROS otherwise negative for unexplained fever, chills, for sensitive rash, Raynaud's phenomenon, serositis, uveitis/iritis.  No known family history of autoimmune diseases such as gout, lupus, rheum  arthritis, psoriasis      Current Medications:  N/A    Medication History:  Medrol Dosepak- helpful, recurrence afterwards    Interval History:   See Above    HISTORY:  Past Medical History:    Atypical chest pain    Per NG : Normal stress echo    History of cardiac catheterization    Per NG: Mild non-obstructive 3 vessel CAD    History of colonic polyps    Per NG: Colonic polyps, dysplasia;  colonoscopy with polypectomy; recheck 7/11    SVT (supraventricular tachycardia) (HCC)    Per NG; Shot in ER ( likely adenosine)      Social Hx Reviewed   Family Hx Reviewed     Medications (Active prior to today's visit):  Current Outpatient Medications    Medication Sig Dispense Refill    methylPREDNISolone (MEDROL) 4 MG Oral Tablet Therapy Pack As directed. 1 each 0    tamsulosin 0.4 MG Oral Cap Take 1 capsule (0.4 mg total) by mouth daily.      sertraline 100 MG Oral Tab Take 1.5 tablets (150 mg total) by mouth daily.      Red Yeast Rice Does not apply Powder Take 1 tablet by mouth daily.      BD HYPODERMIC NEEDLE 22G X 1\" Does not apply Misc USE TO INJECT INTO THE MUSCLE ONCE A WEEK      BD DISP NEEDLES 22G X 1-1/2\" Does not apply Misc       Needles & Syringes (EASY TOUCH SYRINGE BARREL 1ML) Does not apply Misc 1 Syringe As Directed.      progesterone 100 MG Oral Cap       Estradiol Valerate 20 MG/ML Intramuscular Oil       Cholecalciferol (VITAMIN D-3 OR) Take 1 capsule by mouth daily.      Ascorbic Acid (VITAMIN C OR) Take 1 tablet by mouth daily.      Omega-3 Fatty Acids (FISH OIL OR) Take 1 capsule by mouth daily.         Allergies:  Allergies[1]      ROS:   Review of Systems   Constitutional:  Negative for chills and fever.   HENT:  Negative for congestion, hearing loss, mouth sores, nosebleeds and trouble swallowing.    Eyes:  Negative for photophobia, pain, redness and visual disturbance.   Respiratory:  Negative for cough and shortness of breath.    Cardiovascular:  Negative for chest pain, palpitations and leg swelling.   Gastrointestinal:  Negative for abdominal pain, blood in stool, diarrhea and nausea.   Endocrine: Negative for cold intolerance and heat intolerance.   Genitourinary:  Negative for dysuria, frequency and hematuria.   Musculoskeletal:  Positive for arthralgias. Negative for back pain, gait problem, joint swelling, myalgias, neck pain and neck stiffness.   Skin:  Negative for color change and rash.   Neurological:  Negative for dizziness, weakness, numbness and headaches.   Psychiatric/Behavioral:  Negative for confusion and dysphoric mood.         PHYSICAL EXAM:     Constitutional:  Well developed, Well nourished, No acute  distress  HENT:  Normocephalic, Atraumatic, Bilateral external ears normal, Oropharynx moist, No oral exudates.  Neck: Normal range of motion, No tenderness, Supple, No stridor.  Eyes:  PERRL, EOMI, Conjunctiva normal, No discharge.  Respiratory:  Normal breath sounds, No respiratory distress, No wheezing.  Cardiovascular:  Normal heart rate, Normal rhythm, No murmurs, No rubs, No gallops.  GI:  Bowel sounds normal, Soft, No tenderness, No masses, No pulsatile masses.  : No CVA tenderness.  Musculoskeletal: Bilateral knee crepitus on flexion/extension.  Slight tenderness on right shoulder active ROM.  Normal Jobes empty can testing bilaterally.  Normal Hawking's testing bilaterally.  Normal muscle strength of the shoulders bilaterally.  A comprehensive 28 count joint exam was done and was negative for swelling or tenderness except as noted. Inspections for misalignment, asymmetry, crepitation, defects, tenderness, masses, nodules, effusions, range of motion, and stability in the upper and lower extremities bilaterally are all normal unless noted.           Integument:  Warm, Dry, No erythema, No rash.  Lymphatic:  No lymphadenopathy noted.  Neurologic:  Alert & oriented x 3, Normal motor function, Normal sensory function, No focal deficits noted.  Psychiatric:  Affect normal, Judgment normal, Mood normal.    LABS:     Prior lab work reviewed and notable for:     5/4/24:  TSH 1.762 WNL  CMP with BUN 14, CR 1.01, LFTs nonelevated no gamma gap  CBC without cytopenias or leukocytosis  UA with negative blood/protein    Imaging:     N/A                [1] No Known Allergies

## 2024-11-27 ENCOUNTER — OFFICE VISIT (OUTPATIENT)
Dept: RHEUMATOLOGY | Facility: CLINIC | Age: 69
End: 2024-11-27
Payer: MEDICARE

## 2024-11-27 VITALS
SYSTOLIC BLOOD PRESSURE: 105 MMHG | DIASTOLIC BLOOD PRESSURE: 69 MMHG | BODY MASS INDEX: 30.11 KG/M2 | HEART RATE: 83 BPM | WEIGHT: 201 LBS | HEIGHT: 68.5 IN

## 2024-11-27 DIAGNOSIS — M25.511 ACUTE PAIN OF RIGHT SHOULDER: Primary | ICD-10-CM

## 2024-11-27 DIAGNOSIS — M15.9 OSTEOARTHRITIS OF MULTIPLE JOINTS, UNSPECIFIED OSTEOARTHRITIS TYPE: ICD-10-CM

## 2024-11-27 PROCEDURE — 3074F SYST BP LT 130 MM HG: CPT | Performed by: INTERNAL MEDICINE

## 2024-11-27 PROCEDURE — 1125F AMNT PAIN NOTED PAIN PRSNT: CPT | Performed by: INTERNAL MEDICINE

## 2024-11-27 PROCEDURE — 1159F MED LIST DOCD IN RCRD: CPT | Performed by: INTERNAL MEDICINE

## 2024-11-27 PROCEDURE — 20610 DRAIN/INJ JOINT/BURSA W/O US: CPT | Performed by: INTERNAL MEDICINE

## 2024-11-27 PROCEDURE — 99214 OFFICE O/P EST MOD 30 MIN: CPT | Performed by: INTERNAL MEDICINE

## 2024-11-27 PROCEDURE — 3008F BODY MASS INDEX DOCD: CPT | Performed by: INTERNAL MEDICINE

## 2024-11-27 PROCEDURE — 1160F RVW MEDS BY RX/DR IN RCRD: CPT | Performed by: INTERNAL MEDICINE

## 2024-11-27 PROCEDURE — 3078F DIAST BP <80 MM HG: CPT | Performed by: INTERNAL MEDICINE

## 2024-11-27 RX ORDER — TRIAMCINOLONE ACETONIDE 40 MG/ML
40 INJECTION, SUSPENSION INTRA-ARTICULAR; INTRAMUSCULAR ONCE
Status: COMPLETED | OUTPATIENT
Start: 2024-11-27 | End: 2024-11-27

## 2024-12-02 ENCOUNTER — HOSPITAL ENCOUNTER (OUTPATIENT)
Dept: GENERAL RADIOLOGY | Age: 69
Discharge: HOME | End: 2024-12-02
Attending: INTERNAL MEDICINE
Payer: MEDICARE

## 2024-12-02 DIAGNOSIS — M25.511 ACUTE PAIN OF RIGHT SHOULDER: ICD-10-CM

## 2024-12-02 PROCEDURE — 73030 X-RAY EXAM OF SHOULDER: CPT | Performed by: INTERNAL MEDICINE

## 2025-01-28 ENCOUNTER — TELEPHONE (OUTPATIENT)
Dept: INTERNAL MEDICINE CLINIC | Facility: CLINIC | Age: 70
End: 2025-01-28

## 2025-01-28 DIAGNOSIS — Z12.31 ENCOUNTER FOR SCREENING MAMMOGRAM FOR MALIGNANT NEOPLASM OF BREAST: Primary | ICD-10-CM

## 2025-02-05 ENCOUNTER — HOSPITAL ENCOUNTER (OUTPATIENT)
Dept: MAMMOGRAPHY | Facility: HOSPITAL | Age: 70
Discharge: HOME | End: 2025-02-05
Attending: INTERNAL MEDICINE
Payer: MEDICARE

## 2025-02-05 DIAGNOSIS — Z12.31 ENCOUNTER FOR SCREENING MAMMOGRAM FOR MALIGNANT NEOPLASM OF BREAST: ICD-10-CM

## 2025-02-05 PROCEDURE — 77063 BREAST TOMOSYNTHESIS BI: CPT | Performed by: INTERNAL MEDICINE

## 2025-02-05 PROCEDURE — 77067 SCR MAMMO BI INCL CAD: CPT | Performed by: INTERNAL MEDICINE

## 2025-03-19 ENCOUNTER — TELEPHONE (OUTPATIENT)
Facility: CLINIC | Age: 70
End: 2025-03-19

## 2025-03-19 NOTE — TELEPHONE ENCOUNTER
Patient outreach message received:    5 year colonoscopy recall placed in patient outreach.     Recall reminder letter sent out to patient via [employer].

## 2025-03-27 ENCOUNTER — TELEPHONE (OUTPATIENT)
Facility: CLINIC | Age: 70
End: 2025-03-27

## 2025-03-27 DIAGNOSIS — Z86.0100 HX OF COLONIC POLYPS: Primary | ICD-10-CM

## 2025-03-27 NOTE — TELEPHONE ENCOUNTER
Left message to call back.    Colon recall.     Reviewed allergies pharmacy, medications, medical/surgical history on file, and GI symptoms.     Please provide orders if ok to schedule directly.     Last Procedure, Date, MD:  colonoscopy 05/18/2020  Last Diagnosis:  Diminutive cecal polyp   Recalled (mth/yrs): 5 years  Sedation Used Previously:  none  Last Prep Used (if known):    Quality Of Prep (if known): very good  Anticoagulants:  Diuretics:   Diabetic Medication (Includes Insulin):   Weight loss Medication:   Iron/Herbal/Multivitamin Supplements:  Marijuana/Vaping/CBD:  Height/Weight:5'8.5/201  BMI:30.11  Hx of Cardiac &/or CVA Issues (MI/Stroke):  If yes, in the last 12 months?   Devices Pacemaker/Defibrillator/Stent(s):  Respiratory Issues/Oxygen Use/TAWANA/COPD:  CiPAP/BiPAP:   Issues w/ Anesthesia:    Symptoms (Y/N):   Symptoms Details:     Special Comments/Notes:    Thank you!        Final Diagnosis:      Cecal colon polyp:   Tubular adenoma.

## 2025-03-28 NOTE — TELEPHONE ENCOUNTER
Dr Temple  Called patient for a colonoscopy recall, date of birth and name verified.  Please advise on colonoscopy and bowel prep orders.   Medications, pharmacy, and allergies reviewed.     › Insurance: [employer] [place] PPO   › Last PCP visit: 4/10/2024  › Last CBC/CMP: 5/4/2024  › H/W/BMI: 5'9/ 200 lbs/ 30    Special comments/notes:  requesting another bowel prep   Recall    Last Procedure, Date, MD:    5/18/2020 CLN by Dr MAHAJAN   Last diagnosis:  Tubular adenoma   Recalled for (mth/yrs):  5 years (Due in May 2025)   Sedation used previously:  NONE   Last Prep Used:  colyte   Quality of Prep:  Very good     Telephone Colon Screening Questionnaire Yes No   Are you currently experiencing any GI symptoms [] [x]   If yes, explain:     Rectal bleeding [] [x]   Black stool [] [x]   Dysphagia or food \"feeling stuck\" when eating [] [x]   Intractable vomiting [] [x]   Unexplained weight loss [] [x]   First colonoscopy [] [x]   Family history of colon cancer [] [x]   Any issues with anesthesia [] [x]   If yes, explain:      Any recent complaints related to chest pain &/or shortness of breath [] [x]   Referred to a cardiologist?  [] [x]   If yes, explain:      History of  respiratory issues/oxygen/TAWANA/COPD [] [x]   CPAP/BiPAP:     History of devices (pacemaker/defibrillator) [] [x]   History of heart attack &/or stroke [] [x]   If yes, in the last 12 months? Stent placement?  [] [x]     Medication Reconciliation  Yes  No   Anticoagulants (except Aspirin) [] [x]   Diabetic Medication [] [x]   Weight loss medication (phentermine/vyvanse/saxsenda/etc) [] [x]   Iron/herbal/multivitamin supplement(s): red yeast rice, omega 3, Vit C, Vit D3 [x] []   Usage of marijuana, CBD &/or vape product(s) [] [x]          Maverick Taylor MD  5/19/2020  4:06 PM CDT       I spoke to the patient.  He is feeling well.  He had a solitary subcentimeter tubular adenoma removed.  I have discussed the significance.  Based on the patient's previous history of  larger polyps including one with high-grade dysplasia, I am recommending a follow-up colonoscopy in 5 years.     GI RN staff: Please enter colonoscopy recall for 5 years.     Visible to patient: Yes (seen)       Dx: History of colon polyps    2 Result Notes                         Endoscopy Lab Suites                                                         Pathologist:           Maria Teresa Tomlinson MD                                                             Specimen:    Colon polyp, cecal                                                                        Final Diagnosis:      Cecal colon polyp:   Tubular adenoma.

## 2025-04-07 ENCOUNTER — TELEPHONE (OUTPATIENT)
Facility: CLINIC | Age: 70
End: 2025-04-07

## 2025-04-07 RX ORDER — SODIUM, POTASSIUM,MAG SULFATES 17.5-3.13G
SOLUTION, RECONSTITUTED, ORAL ORAL
Qty: 1 EACH | Refills: 0 | Status: SHIPPED | OUTPATIENT
Start: 2025-04-07

## 2025-04-07 NOTE — TELEPHONE ENCOUNTER
Patient has question about prescription for preparation. Patient would like to discuss preparation options. Please call at [phone],thanks.

## 2025-04-08 NOTE — TELEPHONE ENCOUNTER
Spoke to patient and relayed message below. Patient verbalized understanding. Prep instructions sent via [employer].

## 2025-04-09 NOTE — PROGRESS NOTES
Mann Martinez - please place order for another Colyte prep. I have updated pt's new pharmacy in his chart. Thank you! Please have the physician complete the OV dated 4/3 as pt currently on the schedule for an PET CT 4/11 and does require an authorization with the pt's insurance. Thank you

## 2025-08-18 ENCOUNTER — TELEPHONE (OUTPATIENT)
Facility: CLINIC | Age: 70
End: 2025-08-18

## 2025-08-25 ENCOUNTER — TELEPHONE (OUTPATIENT)
Facility: CLINIC | Age: 70
End: 2025-08-25

## 2025-08-25 DIAGNOSIS — Z86.0100 HX OF COLONIC POLYPS: Primary | ICD-10-CM

## (undated) DEVICE — Device: Brand: CUSTOM PROCEDURE KIT

## (undated) DEVICE — 3 ML SYRINGE LUER-LOCK TIP: Brand: MONOJECT

## (undated) DEVICE — 35 ML SYRINGE REGULAR TIP: Brand: MONOJECT

## (undated) DEVICE — SNARE CAPTIFLEX MICRO-OVL OLY

## (undated) DEVICE — Device: Brand: DEFENDO AIR/WATER/SUCTION AND BIOPSY VALVE

## (undated) DEVICE — MEDI-VAC NON-CONDUCTIVE SUCTION TUBING 6MM X 1.8M (6FT.) L: Brand: CARDINAL HEALTH

## (undated) DEVICE — LINE MNTR ADLT SET O2 INTMD

## (undated) DEVICE — 6 ML SYRINGE LUER-LOCK TIP: Brand: MONOJECT

## (undated) DEVICE — SNARE OPTMZ PLPCTM TRP

## (undated) NOTE — ED AVS SNAPSHOT
Leonard Byrnes   MRN: [id number]    Department:  [facility] Emergency Department   Date of Visit:  7/9/2018           Disclosure     Insurance plans vary and the physician(s) referred by the ER may not be covered by your plan.  Please contact CARE PHYSICIAN AT ONCE OR RETURN IMMEDIATELY TO THE EMERGENCY DEPARTMENT. If you have been prescribed any medication(s), please fill your prescription right away and begin taking the medication(s) as directed.   If you believe that any of the medications

## (undated) NOTE — LETTER
10/2/2019    Erick Rangel        [address]            Dear Erick Rangel,      [facility] records indicate that you are due for an appointment for a Colonoscopy in November 2019, or shortly there after, with Brant Verma

## (undated) NOTE — LETTER
No referring provider defined for this encounter. 05/22/18        Patient: Franchesca Steiner   YOB: 1955   Date of Visit: 5/22/2018       Dear  Dr. Fabio Cruz MD,      Thank you for referring Franchesca Steiner to my practice.   Pl Plan: Pt c/o weak stream. UA for further evaluation. RECOMMENDATIONS AND TREATMENT PLAN      1. Proceed with plans for ultrasound-guided needle biopsy of prostate in the office    2.    Stop aspirin  10   days before biopsy and stop NSAIDs such as

## (undated) NOTE — LETTER
04/13/20        Lindsay Booth  [address]      Dear Taylor Banuelos,    [facility] records indicate that you have outstanding lab work and or testing that was ordered for you and has not yet been completed:  Orders Placed This Encounter      Urin

## (undated) NOTE — MR AVS SNAPSHOT
[address] [address]  [phone]               Thank you for choosing us for your health care visit with Lina Morrow MD.  We are glad to serve you and happy to provide you with this summar MyChart     Visit [employer]  You can access your MyChart to more actively manage your health care and view more details from this visit by going to [email or web address].   If you've recently had a stay at the Hospital you can access your discharge track your progress   You don’t need to join a gym. Home exercises work great.  Put more priority on exercise in your life                    Visit [facility] online at  [email or web address]

## (undated) NOTE — LETTER
Nadeen Raphael,     This is the [facility], office of Dr. Eduardo Caban    Thank you for putting your trust in [facility].  Our goal is to deliver the highest quality healthcare and an exceptional patient experience. Upon reviewing of your medical record shows you are due for the following:       l   Mammogram   Dexa Scan           Please call [phone] to schedule your appointment or schedule online via [employer].     If you changed to a new provider at another facility, please notify the clinic to update your records.    If you had any recent testing at another facility, please have your results faxed to our office at [phone].           Thank you and have a great day!

## (undated) NOTE — LETTER
3/19/2025    Emil Askew        [address] [place] [id number]            Dear Emil Askew,      Our records indicate that you are due for an appointment for a Colonoscopy with Maverick Taylor MD. Our doctors are booking out about 3-6 months in advance for procedures.     Please call our office to schedule this appointment.  Your medical well-being is important to us.    If your insurance requires a referral, please call your primary care office to request one.      Thank you,      The Physicians and Staff at [facility]